# Patient Record
Sex: FEMALE | Race: BLACK OR AFRICAN AMERICAN | Employment: OTHER | URBAN - METROPOLITAN AREA
[De-identification: names, ages, dates, MRNs, and addresses within clinical notes are randomized per-mention and may not be internally consistent; named-entity substitution may affect disease eponyms.]

---

## 2017-01-10 ENCOUNTER — TELEPHONE (OUTPATIENT)
Dept: INTERNAL MEDICINE | Age: 82
End: 2017-01-10

## 2017-02-14 DIAGNOSIS — J45.20 MILD INTERMITTENT ASTHMA WITHOUT COMPLICATION: ICD-10-CM

## 2017-02-14 RX ORDER — ALBUTEROL SULFATE 90 UG/1
2 AEROSOL, METERED RESPIRATORY (INHALATION) EVERY 6 HOURS PRN
Qty: 1 INHALER | Refills: 3 | Status: SHIPPED | OUTPATIENT
Start: 2017-02-14 | End: 2017-05-24 | Stop reason: SDUPTHER

## 2017-02-28 ENCOUNTER — OFFICE VISIT (OUTPATIENT)
Dept: INTERNAL MEDICINE | Age: 82
End: 2017-02-28
Attending: HOSPITALIST

## 2017-02-28 VITALS
TEMPERATURE: 97.5 F | SYSTOLIC BLOOD PRESSURE: 113 MMHG | RESPIRATION RATE: 18 BRPM | WEIGHT: 110.2 LBS | OXYGEN SATURATION: 95 % | BODY MASS INDEX: 17.79 KG/M2 | DIASTOLIC BLOOD PRESSURE: 65 MMHG | HEART RATE: 95 BPM

## 2017-02-28 DIAGNOSIS — J45.30 MILD PERSISTENT ASTHMA WITHOUT COMPLICATION: Chronic | ICD-10-CM

## 2017-02-28 DIAGNOSIS — I10 ESSENTIAL HYPERTENSION: ICD-10-CM

## 2017-02-28 DIAGNOSIS — M81.0 OSTEOPOROSIS: Primary | Chronic | ICD-10-CM

## 2017-02-28 RX ORDER — OYSTER SHELL CALCIUM WITH VITAMIN D 500; 200 MG/1; [IU]/1
1 TABLET, FILM COATED ORAL 2 TIMES DAILY
Qty: 60 TABLET | Refills: 5 | Status: SHIPPED | OUTPATIENT
Start: 2017-02-28 | End: 2017-06-01 | Stop reason: SDUPTHER

## 2017-02-28 RX ORDER — OYSTER SHELL CALCIUM WITH VITAMIN D 500; 200 MG/1; [IU]/1
1 TABLET, FILM COATED ORAL 2 TIMES DAILY
Qty: 60 TABLET | Refills: 2 | Status: SHIPPED | OUTPATIENT
Start: 2017-02-28 | End: 2017-02-28 | Stop reason: SDUPTHER

## 2017-02-28 ASSESSMENT — ENCOUNTER SYMPTOMS
ABDOMINAL PAIN: 0
VOMITING: 0
CONSTIPATION: 0
BACK PAIN: 0
COUGH: 0
NAUSEA: 0
SORE THROAT: 0
DIARRHEA: 0

## 2017-05-24 DIAGNOSIS — J45.20 MILD INTERMITTENT ASTHMA WITHOUT COMPLICATION: ICD-10-CM

## 2017-05-24 RX ORDER — ALBUTEROL SULFATE 90 UG/1
2 AEROSOL, METERED RESPIRATORY (INHALATION) EVERY 6 HOURS PRN
Qty: 1 INHALER | Refills: 1
Start: 2017-05-24 | End: 2017-07-10 | Stop reason: SDUPTHER

## 2017-05-25 DIAGNOSIS — M81.0 OSTEOPOROSIS: ICD-10-CM

## 2017-05-25 RX ORDER — CYCLOSPORINE 0.5 MG/ML
1 EMULSION OPHTHALMIC 3 TIMES DAILY
Qty: 180 EACH | Refills: 1
Start: 2017-05-25 | End: 2017-06-06 | Stop reason: SDUPTHER

## 2017-06-01 DIAGNOSIS — M81.0 OSTEOPOROSIS: Chronic | ICD-10-CM

## 2017-06-01 RX ORDER — OYSTER SHELL CALCIUM WITH VITAMIN D 500; 200 MG/1; [IU]/1
1 TABLET, FILM COATED ORAL 2 TIMES DAILY
Qty: 60 TABLET | Refills: 0
Start: 2017-06-01 | End: 2018-03-22 | Stop reason: SDUPTHER

## 2017-06-06 DIAGNOSIS — M81.0 OSTEOPOROSIS: ICD-10-CM

## 2017-06-06 RX ORDER — CYCLOSPORINE 0.5 MG/ML
1 EMULSION OPHTHALMIC 3 TIMES DAILY
Qty: 180 EACH | Refills: 1
Start: 2017-06-06 | End: 2018-03-20 | Stop reason: SDUPTHER

## 2017-06-20 ENCOUNTER — OFFICE VISIT (OUTPATIENT)
Dept: INTERNAL MEDICINE | Age: 82
End: 2017-06-20
Attending: HOSPITALIST

## 2017-06-20 VITALS
OXYGEN SATURATION: 96 % | DIASTOLIC BLOOD PRESSURE: 70 MMHG | RESPIRATION RATE: 18 BRPM | BODY MASS INDEX: 18.5 KG/M2 | TEMPERATURE: 97.9 F | WEIGHT: 114.6 LBS | SYSTOLIC BLOOD PRESSURE: 130 MMHG | HEART RATE: 98 BPM

## 2017-06-20 DIAGNOSIS — I10 ESSENTIAL HYPERTENSION: Primary | ICD-10-CM

## 2017-06-20 ASSESSMENT — ENCOUNTER SYMPTOMS
CONSTIPATION: 0
COUGH: 0
BACK PAIN: 0
VOMITING: 0
ABDOMINAL PAIN: 0
SORE THROAT: 0
NAUSEA: 0
DIARRHEA: 0

## 2017-07-10 DIAGNOSIS — J45.20 MILD INTERMITTENT ASTHMA WITHOUT COMPLICATION: ICD-10-CM

## 2017-07-10 RX ORDER — ALBUTEROL SULFATE 90 UG/1
2 AEROSOL, METERED RESPIRATORY (INHALATION) EVERY 6 HOURS PRN
Qty: 1 INHALER | Refills: 2
Start: 2017-07-10 | End: 2017-09-19 | Stop reason: SDUPTHER

## 2017-07-10 RX ORDER — ALBUTEROL SULFATE 90 UG/1
2 AEROSOL, METERED RESPIRATORY (INHALATION) EVERY 6 HOURS PRN
Qty: 1 INHALER | Refills: 3
Start: 2017-07-10 | End: 2017-07-10 | Stop reason: SDUPTHER

## 2017-08-15 RX ORDER — AMLODIPINE BESYLATE 5 MG/1
5 TABLET ORAL DAILY
Qty: 90 TABLET | Refills: 1
Start: 2017-08-15 | End: 2018-02-20 | Stop reason: SDUPTHER

## 2017-08-29 ENCOUNTER — OFFICE VISIT (OUTPATIENT)
Dept: INTERNAL MEDICINE | Age: 82
End: 2017-08-29
Attending: HOSPITALIST

## 2017-08-29 VITALS
BODY MASS INDEX: 18.43 KG/M2 | HEART RATE: 82 BPM | SYSTOLIC BLOOD PRESSURE: 140 MMHG | DIASTOLIC BLOOD PRESSURE: 79 MMHG | RESPIRATION RATE: 18 BRPM | OXYGEN SATURATION: 95 % | WEIGHT: 114.2 LBS | TEMPERATURE: 98.2 F

## 2017-08-29 DIAGNOSIS — I10 ESSENTIAL HYPERTENSION: Primary | ICD-10-CM

## 2017-08-29 ASSESSMENT — ENCOUNTER SYMPTOMS
ABDOMINAL PAIN: 0
VOMITING: 0
COUGH: 0
DIARRHEA: 0
SORE THROAT: 0
CONSTIPATION: 0
BACK PAIN: 0
NAUSEA: 0

## 2017-08-30 ENCOUNTER — TELEPHONE (OUTPATIENT)
Dept: INTERNAL MEDICINE | Age: 82
End: 2017-08-30

## 2017-09-11 ENCOUNTER — TELEPHONE (OUTPATIENT)
Dept: INTERNAL MEDICINE | Age: 82
End: 2017-09-11

## 2017-09-19 DIAGNOSIS — J45.20 MILD INTERMITTENT ASTHMA WITHOUT COMPLICATION: ICD-10-CM

## 2017-09-19 RX ORDER — ALBUTEROL SULFATE 90 UG/1
2 AEROSOL, METERED RESPIRATORY (INHALATION) EVERY 6 HOURS PRN
Qty: 1 INHALER | Refills: 2
Start: 2017-09-19 | End: 2017-11-21 | Stop reason: SDUPTHER

## 2017-11-21 DIAGNOSIS — J45.20 MILD INTERMITTENT ASTHMA WITHOUT COMPLICATION: ICD-10-CM

## 2017-11-21 RX ORDER — ALBUTEROL SULFATE 90 UG/1
2 AEROSOL, METERED RESPIRATORY (INHALATION) EVERY 6 HOURS PRN
Qty: 1 INHALER | Refills: 1 | OUTPATIENT
Start: 2017-11-21 | End: 2017-12-27 | Stop reason: SDUPTHER

## 2017-11-28 ENCOUNTER — OFFICE VISIT (OUTPATIENT)
Dept: INTERNAL MEDICINE | Age: 82
End: 2017-11-28
Attending: HOSPITALIST

## 2017-11-28 VITALS
DIASTOLIC BLOOD PRESSURE: 78 MMHG | OXYGEN SATURATION: 97 % | WEIGHT: 118.6 LBS | TEMPERATURE: 97 F | RESPIRATION RATE: 18 BRPM | HEART RATE: 90 BPM | SYSTOLIC BLOOD PRESSURE: 129 MMHG | BODY MASS INDEX: 19.14 KG/M2

## 2017-11-28 DIAGNOSIS — I10 HYPERTENSION, UNSPECIFIED TYPE: Primary | ICD-10-CM

## 2017-11-28 ASSESSMENT — ENCOUNTER SYMPTOMS
ABDOMINAL PAIN: 0
CONSTIPATION: 0
DIARRHEA: 0
SORE THROAT: 0
COUGH: 0
BACK PAIN: 0
VOMITING: 0
NAUSEA: 0

## 2017-11-28 NOTE — PROGRESS NOTES
hours as needed for Wheezing 11/21/17  Yes Ludmila Soler MD   amLODIPine [facility]) 5 MG tablet Take 1 tablet by mouth daily 8/15/17  Yes Ludmila Soler MD   cycloSPORINE (RESTASIS) 0.05 % ophthalmic emulsion Place 1 drop into both eyes three times daily 6/6/17  Yes Evita Mckenzie MD   calcium-vitamin D (OSCAL-500) 500-200 MG-UNIT per tablet Take 1 tablet by mouth 2 times daily 6/1/17  Yes Jenni Huang MD   budesonide (PULMICORT FLEXHALER) 180 MCG/ACT AEPB inhaler Inhale 1 puff into the lungs 2 times daily 11/8/16   Jason Riddle MD   Spacer/Aero-Holding María Aniyah 1 Device by Does not apply route daily as needed (asthma) 11/8/16   Jason Riddle MD     Review of Systems   Constitutional: Negative for chills and fever. HENT: Negative for congestion and sore throat. Respiratory: Negative for cough. Cardiovascular: Negative for chest pain and palpitations. Gastrointestinal: Negative for abdominal pain, constipation, diarrhea, nausea and vomiting. Genitourinary: Negative for dysuria. Musculoskeletal: Negative for back pain. Skin: Negative for rash. Neurological: Positive for headaches. Negative for loss of consciousness. Psychiatric/Behavioral: Negative for depression. Physical Exam:      Vitals:   /78   Pulse 90   Temp 97 °F (36.1 °C)   Resp 18   Wt 118 lb 9.6 oz (53.8 kg)   SpO2 97%   BMI 19.14 kg/m²     Body mass index is 19.14 kg/m². Wt Readings from Last 3 Encounters:   11/28/17 118 lb 9.6 oz (53.8 kg)   08/29/17 114 lb 3.2 oz (51.8 kg)   06/20/17 114 lb 9.6 oz (52 kg)       General:  Patient conversant, appears comfortable, no acute distress, thin emaciated. Skin: No rash, No jaundice. Eyes: PERRL. No conjunctival Injection. No Scleral Icterus. EOMI. Ears: No tenderness or discharge, No TM inflammation    Nose: Nares Symmetric, No tenderness or discharge, No Frontal/Maxillary Sinus Tenderness. Mouth, Throat: No erythema, exudates.    Neck: Supple, No cervical Lymphadenopathy, Normal Thyroid size/no goiter. Heart: RRR, nl s1, s2 no murmurs, rubs, gallops  Lungs: CTA B, no wheezing, no rhonchi, no rales. Abdomen: Soft non tender, non-distended. MSK: Muscle strength grossly intact. Vascular: Radial pulses present, normal b/l. Dorsalis Pedis pulses present, normal b/l. Neurologcial: No focal deficits. Cranial Nerves 2-12 intact. Sensation grossly normal. DTRs normal throughout. LABS:    Chemistry:  No results for input(s): BUN, CREATININE, NA, K, CO2, CL, MG, PHOS, AST, ALT, ALB, PROT in the last 72 hours. Invalid input(s): GLU, CA, TBILI, DBILI, ALP, GLUFASTING    No results for input(s): ALKPHOS, ALT, AST, PROT, BILITOT, BILIDIR, LABALBU in the last 72 hours. No results for input(s): Huong Cassidy, LABMICAMBROCIO, MICROALKAMI, Brandon Andrade in the last 72 hours. Lab Results   Component Value Date    TSH 1.49 07/16/2013     Hematology:  No results for input(s): WBC, HGB, HCT, PLT, MCV, MCH, MCHC, RDW, EOSABS in the last 72 hours. Invalid input(s): NEUTP, LYMPHP, MONOSP, EOSP, BASOP, NEUTABS, LYMPHABS, MONOABS, BASOABS    No results found for: IRON, TIBC, FERRITIN, FOLATE, [id number], PTH    Lipid:  Lab Results   Component Value Date    CHOL 163 04/14/2015    HDL 88 (H) 04/14/2015    LDLCALC 57 04/14/2015    TRIG 90 04/14/2015       U/A:No results found for: LABMICR, [id number]    Imaging:   No results found. Health Maintenance   INFLUENZA: Refused FLU vaccine. Assessment/Plan:     [de-identified] yo F with Dementia presents for routine clinic follow up. No significant changes made during today's visit. Dementia  -Likely Alzheimer's. Pt has trouble managing her simple medication regimen and gets easily confused. She attributes this to age. Scored a 13 on MOCA. Scored a 21 on MMSE.   -Patient previously on Namenda but has stopped taking it due to lack of efficacy per patient.  -Will try to get home health or COA involved again as I am worried about

## 2017-12-27 DIAGNOSIS — J45.20 MILD INTERMITTENT ASTHMA WITHOUT COMPLICATION: ICD-10-CM

## 2018-02-20 DIAGNOSIS — J45.20 MILD INTERMITTENT ASTHMA WITHOUT COMPLICATION: ICD-10-CM

## 2018-02-20 RX ORDER — ALBUTEROL SULFATE 90 UG/1
AEROSOL, METERED RESPIRATORY (INHALATION)
Qty: 18 INHALER | Refills: 1 | Status: CANCELLED | OUTPATIENT
Start: 2018-02-20

## 2018-02-20 RX ORDER — AMLODIPINE BESYLATE 5 MG/1
5 TABLET ORAL DAILY
Qty: 90 TABLET | Refills: 1
Start: 2018-02-20 | End: 2018-02-21 | Stop reason: SDUPTHER

## 2018-02-21 RX ORDER — AMLODIPINE BESYLATE 5 MG/1
5 TABLET ORAL DAILY
Qty: 30 TABLET | Refills: 1
Start: 2018-02-21 | End: 2018-03-20 | Stop reason: SDUPTHER

## 2018-03-20 ENCOUNTER — OFFICE VISIT (OUTPATIENT)
Dept: INTERNAL MEDICINE | Age: 83
End: 2018-03-20
Attending: HOSPITALIST

## 2018-03-20 VITALS
BODY MASS INDEX: 20.01 KG/M2 | TEMPERATURE: 97.8 F | RESPIRATION RATE: 16 BRPM | WEIGHT: 124 LBS | SYSTOLIC BLOOD PRESSURE: 153 MMHG | HEART RATE: 102 BPM | OXYGEN SATURATION: 96 % | DIASTOLIC BLOOD PRESSURE: 89 MMHG

## 2018-03-20 DIAGNOSIS — M81.0 AGE-RELATED OSTEOPOROSIS WITHOUT CURRENT PATHOLOGICAL FRACTURE: ICD-10-CM

## 2018-03-20 DIAGNOSIS — J45.20 MILD INTERMITTENT ASTHMA WITHOUT COMPLICATION: Primary | Chronic | ICD-10-CM

## 2018-03-20 DIAGNOSIS — I10 ESSENTIAL HYPERTENSION: ICD-10-CM

## 2018-03-20 DIAGNOSIS — R63.4 WEIGHT LOSS, UNINTENTIONAL: Chronic | ICD-10-CM

## 2018-03-20 RX ORDER — CYCLOSPORINE 0.5 MG/ML
1 EMULSION OPHTHALMIC 3 TIMES DAILY
Qty: 180 EACH | Refills: 1 | Status: SHIPPED | OUTPATIENT
Start: 2018-03-20 | End: 2019-08-15 | Stop reason: SDUPTHER

## 2018-03-20 RX ORDER — AMLODIPINE BESYLATE 5 MG/1
5 TABLET ORAL DAILY
Qty: 30 TABLET | Refills: 1 | Status: SHIPPED | OUTPATIENT
Start: 2018-03-20 | End: 2018-04-26 | Stop reason: SDUPTHER

## 2018-03-20 ASSESSMENT — ENCOUNTER SYMPTOMS
NAUSEA: 0
BACK PAIN: 0
CONSTIPATION: 0
SORE THROAT: 0
DIARRHEA: 0
COUGH: 0
ABDOMINAL PAIN: 0
VOMITING: 0

## 2018-03-20 NOTE — PROGRESS NOTES
Department Of Internal Medicine  General Medicine/Primary Care  Established Patient Visit    Patient:  Lachelle Huggins                                               : 1935  Age: 81 y.o. MRN: [id number]  Date : 3/20/2018    History Obtained From:  patient    REASON FOR VISIT:  Clinic follow up (routine)    HISTORY OF PRESENT ILLNESS:   The patient is a 81 y.o. female w/ PMH of HTN, asthma, and anxiety who presents for follow up. Overall she is feeling better. Appetite has improved. Upset she is not able to drive. Has a [employer] member drive her to services. Has not been taking her pulmicort, only her ventolin inhaler. States she is feeling good and hasn't been using her inhaler too much but is unable to specify how often she is using it. Able to walk up and down 3 flights of stairs without getting SOB. Patient previously prescribed Namenda for dementia. She stopped taking it after a few months since it did not seem to be helping. Still able to do ADLs without problems, no falls. Goes to [employer] every week. Past Medical History:        Diagnosis Date    Asthma     Constipation     Hypertension     Osteoporosis     Sinus trouble     hx of    Vitamin D deficiency        Past Surgical History:        Procedure Laterality Date    HYSTERECTOMY      THYROIDECTOMY  66       Family History:   No family history on file. Social History:   TOBACCO:   reports that she quit smoking about 49 years ago. She does not have any smokeless tobacco history on file. ETOH:   reports that she does not drink alcohol. OCCUPATION:  Worked in [employer]    Allergies:  Aspirin; Codeine; Pcn [penicillins]; Prednisone; and Zyban [bupropion hcl]    Current Medications:    Prior to Admission medications    Medication Sig Start Date End Date Taking?  Authorizing Provider   amLODIPine (NORVASC) 5 MG tablet Take 1 tablet by mouth daily 18  Yes Chirag Hammer MD   VENTOLIN  (90 Base) MCG/ACT inhaler INHALE 2 PUFFS EVERY 6 HOURS AS NEEDED 12/27/17  Yes Portia Alonso MD   cycloSPORINE (RESTASIS) 0.05 % ophthalmic emulsion Place 1 drop into both eyes three times daily 6/6/17  Yes Moris Oneill MD   calcium-vitamin D (OSCAL-500) 500-200 MG-UNIT per tablet Take 1 tablet by mouth 2 times daily 6/1/17  Yes Cecile Alpers, MD   budesonide (PULMICORT FLEXHALER) 180 MCG/ACT AEPB inhaler Inhale 1 puff into the lungs 2 times daily 11/8/16  Yes Ld Bain MD   Spacer/Aero-Holding Mariola Alexandr 1 Device by Does not apply route daily as needed (asthma) 11/8/16  Yes Ld Bain MD     Review of Systems   Constitutional: Negative for chills and fever. HENT: Negative for congestion and sore throat. Respiratory: Negative for cough. Cardiovascular: Negative for chest pain and palpitations. Gastrointestinal: Negative for abdominal pain, constipation, diarrhea, nausea and vomiting. Genitourinary: Negative for dysuria. Musculoskeletal: Negative for back pain. Skin: Negative for rash. Neurological: Negative for loss of consciousness and headaches. Psychiatric/Behavioral: Negative for depression. Physical Exam:      Vitals:   BP (!) 153/89 (Site: Right Arm, Position: Sitting, Cuff Size: Medium Adult)   Pulse 102   Temp 97.8 °F (36.6 °C) (Oral)   Resp 16   Wt 124 lb (56.2 kg)   SpO2 96%   BMI 20.01 kg/m²     Body mass index is 20.01 kg/m². Wt Readings from Last 3 Encounters:   03/20/18 124 lb (56.2 kg)   11/28/17 118 lb 9.6 oz (53.8 kg)   08/29/17 114 lb 3.2 oz (51.8 kg)       General:  Patient conversant, appears comfortable, no acute distress, thin emaciated. Skin: No rash, No jaundice. Eyes: PERRL. No conjunctival Injection. No Scleral Icterus. EOMI. Ears: No tenderness or discharge, No TM inflammation    Nose: Nares Symmetric, No tenderness or discharge, No Frontal/Maxillary Sinus Tenderness. Mouth, Throat: No erythema, exudates.    Neck: Supple, No

## 2018-03-22 RX ORDER — OYSTER SHELL CALCIUM WITH VITAMIN D 500; 200 MG/1; [IU]/1
1 TABLET, FILM COATED ORAL 2 TIMES DAILY
Qty: 60 TABLET | Refills: 2
Start: 2018-03-22 | End: 2018-12-19 | Stop reason: SDUPTHER

## 2018-04-10 DIAGNOSIS — J45.20 MILD INTERMITTENT ASTHMA WITHOUT COMPLICATION: ICD-10-CM

## 2018-04-10 RX ORDER — ALBUTEROL SULFATE 90 UG/1
2 AEROSOL, METERED RESPIRATORY (INHALATION) EVERY 6 HOURS PRN
Qty: 18 INHALER | Refills: 5
Start: 2018-04-10 | End: 2018-04-10 | Stop reason: SDUPTHER

## 2018-04-10 RX ORDER — ALBUTEROL SULFATE 90 UG/1
2 AEROSOL, METERED RESPIRATORY (INHALATION) EVERY 6 HOURS PRN
Qty: 18 INHALER | Refills: 1
Start: 2018-04-10 | End: 2018-04-26 | Stop reason: SDUPTHER

## 2018-04-26 DIAGNOSIS — J45.20 MILD INTERMITTENT ASTHMA WITHOUT COMPLICATION: ICD-10-CM

## 2018-04-26 DIAGNOSIS — I10 ESSENTIAL HYPERTENSION: ICD-10-CM

## 2018-04-26 RX ORDER — ALBUTEROL SULFATE 90 UG/1
2 AEROSOL, METERED RESPIRATORY (INHALATION) EVERY 6 HOURS PRN
Qty: 18 INHALER | Refills: 1 | OUTPATIENT
Start: 2018-04-26 | End: 2018-09-28 | Stop reason: SDUPTHER

## 2018-04-26 RX ORDER — AMLODIPINE BESYLATE 5 MG/1
5 TABLET ORAL DAILY
Qty: 30 TABLET | Refills: 1 | OUTPATIENT
Start: 2018-04-26 | End: 2018-07-31 | Stop reason: SDUPTHER

## 2018-07-31 DIAGNOSIS — I10 ESSENTIAL HYPERTENSION: ICD-10-CM

## 2018-07-31 RX ORDER — AMLODIPINE BESYLATE 5 MG/1
5 TABLET ORAL DAILY
Qty: 30 TABLET | Refills: 0
Start: 2018-07-31 | End: 2018-08-21 | Stop reason: SDUPTHER

## 2018-08-21 DIAGNOSIS — I10 ESSENTIAL HYPERTENSION: ICD-10-CM

## 2018-08-21 RX ORDER — AMLODIPINE BESYLATE 5 MG/1
5 TABLET ORAL DAILY
Qty: 30 TABLET | Refills: 1
Start: 2018-08-21 | End: 2018-10-08 | Stop reason: SDUPTHER

## 2018-09-28 DIAGNOSIS — J45.20 MILD INTERMITTENT ASTHMA WITHOUT COMPLICATION: ICD-10-CM

## 2018-09-28 RX ORDER — ALBUTEROL SULFATE 90 UG/1
2 AEROSOL, METERED RESPIRATORY (INHALATION) EVERY 6 HOURS PRN
Qty: 18 INHALER | Refills: 1
Start: 2018-09-28 | End: 2018-11-29 | Stop reason: SDUPTHER

## 2018-10-08 DIAGNOSIS — I10 ESSENTIAL HYPERTENSION: ICD-10-CM

## 2018-10-08 RX ORDER — AMLODIPINE BESYLATE 5 MG/1
5 TABLET ORAL DAILY
Qty: 30 TABLET | Refills: 0
Start: 2018-10-08 | End: 2018-12-19 | Stop reason: SDUPTHER

## 2018-11-29 DIAGNOSIS — J45.20 MILD INTERMITTENT ASTHMA WITHOUT COMPLICATION: ICD-10-CM

## 2018-11-29 RX ORDER — ALBUTEROL SULFATE 90 UG/1
2 AEROSOL, METERED RESPIRATORY (INHALATION) EVERY 6 HOURS PRN
Qty: 18 INHALER | Refills: 0 | OUTPATIENT
Start: 2018-11-29 | End: 2018-12-19 | Stop reason: SDUPTHER

## 2018-12-19 ENCOUNTER — OFFICE VISIT (OUTPATIENT)
Dept: INTERNAL MEDICINE CLINIC | Age: 83
End: 2018-12-19
Payer: MEDICARE

## 2018-12-19 VITALS
HEIGHT: 66 IN | OXYGEN SATURATION: 98 % | DIASTOLIC BLOOD PRESSURE: 84 MMHG | HEART RATE: 85 BPM | TEMPERATURE: 97.8 F | BODY MASS INDEX: 19.73 KG/M2 | RESPIRATION RATE: 16 BRPM | SYSTOLIC BLOOD PRESSURE: 139 MMHG | WEIGHT: 122.8 LBS

## 2018-12-19 DIAGNOSIS — I10 ESSENTIAL HYPERTENSION: ICD-10-CM

## 2018-12-19 DIAGNOSIS — Z13.220 SCREENING FOR HYPERLIPIDEMIA: Primary | ICD-10-CM

## 2018-12-19 DIAGNOSIS — E03.2 HYPOTHYROIDISM DUE TO NON-MEDICATION EXOGENOUS SUBSTANCES: ICD-10-CM

## 2018-12-19 DIAGNOSIS — J45.20 MILD INTERMITTENT ASTHMA WITHOUT COMPLICATION: ICD-10-CM

## 2018-12-19 DIAGNOSIS — R79.9 BLOOD CHEMISTRY ABNORMALITY: ICD-10-CM

## 2018-12-19 DIAGNOSIS — D50.8 OTHER IRON DEFICIENCY ANEMIA: ICD-10-CM

## 2018-12-19 LAB
A/G RATIO: 1.3 (ref 1.1–2.2)
ALBUMIN SERPL-MCNC: 4.4 G/DL (ref 3.4–5)
ALP BLD-CCNC: 79 U/L (ref 40–129)
ALT SERPL-CCNC: 10 U/L (ref 10–40)
ANION GAP SERPL CALCULATED.3IONS-SCNC: 10 MMOL/L (ref 3–16)
AST SERPL-CCNC: 14 U/L (ref 15–37)
BASOPHILS ABSOLUTE: 0 K/UL (ref 0–0.2)
BASOPHILS RELATIVE PERCENT: 0.9 %
BILIRUB SERPL-MCNC: 0.7 MG/DL (ref 0–1)
BUN BLDV-MCNC: 10 MG/DL (ref 7–20)
CALCIUM SERPL-MCNC: 10 MG/DL (ref 8.3–10.6)
CHLORIDE BLD-SCNC: 103 MMOL/L (ref 99–110)
CHOLESTEROL, TOTAL: 202 MG/DL (ref 0–199)
CO2: 29 MMOL/L (ref 21–32)
CREAT SERPL-MCNC: 0.6 MG/DL (ref 0.6–1.2)
EOSINOPHILS ABSOLUTE: 0.2 K/UL (ref 0–0.6)
EOSINOPHILS RELATIVE PERCENT: 3.5 %
GFR AFRICAN AMERICAN: >60
GFR NON-AFRICAN AMERICAN: >60
GLOBULIN: 3.3 G/DL
GLUCOSE BLD-MCNC: 85 MG/DL (ref 70–99)
HCT VFR BLD CALC: 42.8 % (ref 36–48)
HDLC SERPL-MCNC: 59 MG/DL (ref 40–60)
HEMOGLOBIN: 13.9 G/DL (ref 12–16)
LDL CHOLESTEROL CALCULATED: 117 MG/DL
LYMPHOCYTES ABSOLUTE: 1.4 K/UL (ref 1–5.1)
LYMPHOCYTES RELATIVE PERCENT: 27.1 %
MCH RBC QN AUTO: 31 PG (ref 26–34)
MCHC RBC AUTO-ENTMCNC: 32.5 G/DL (ref 31–36)
MCV RBC AUTO: 95.6 FL (ref 80–100)
MONOCYTES ABSOLUTE: 0.2 K/UL (ref 0–1.3)
MONOCYTES RELATIVE PERCENT: 4.2 %
NEUTROPHILS ABSOLUTE: 3.4 K/UL (ref 1.7–7.7)
NEUTROPHILS RELATIVE PERCENT: 64.3 %
PDW BLD-RTO: 12.4 % (ref 12.4–15.4)
PLATELET # BLD: 264 K/UL (ref 135–450)
PMV BLD AUTO: 8.7 FL (ref 5–10.5)
POTASSIUM SERPL-SCNC: 3.8 MMOL/L (ref 3.5–5.1)
RBC # BLD: 4.47 M/UL (ref 4–5.2)
SODIUM BLD-SCNC: 142 MMOL/L (ref 136–145)
TOTAL PROTEIN: 7.7 G/DL (ref 6.4–8.2)
TRIGL SERPL-MCNC: 130 MG/DL (ref 0–150)
TSH REFLEX: 1.15 UIU/ML (ref 0.27–4.2)
VLDLC SERPL CALC-MCNC: 26 MG/DL
WBC # BLD: 5.3 K/UL (ref 4–11)

## 2018-12-19 PROCEDURE — 85025 COMPLETE CBC W/AUTO DIFF WBC: CPT

## 2018-12-19 PROCEDURE — 82746 ASSAY OF FOLIC ACID SERUM: CPT

## 2018-12-19 PROCEDURE — 84443 ASSAY THYROID STIM HORMONE: CPT

## 2018-12-19 PROCEDURE — 80053 COMPREHEN METABOLIC PANEL: CPT

## 2018-12-19 PROCEDURE — 80061 LIPID PANEL: CPT

## 2018-12-19 PROCEDURE — 36415 COLL VENOUS BLD VENIPUNCTURE: CPT | Performed by: INTERNAL MEDICINE

## 2018-12-19 PROCEDURE — 99213 OFFICE O/P EST LOW 20 MIN: CPT | Performed by: INTERNAL MEDICINE

## 2018-12-19 PROCEDURE — 82306 VITAMIN D 25 HYDROXY: CPT

## 2018-12-19 RX ORDER — OYSTER SHELL CALCIUM WITH VITAMIN D 500; 200 MG/1; [IU]/1
1 TABLET, FILM COATED ORAL 2 TIMES DAILY
Qty: 60 TABLET | Refills: 2 | Status: SHIPPED | OUTPATIENT
Start: 2018-12-19 | End: 2019-04-23

## 2018-12-19 RX ORDER — AMLODIPINE BESYLATE 5 MG/1
5 TABLET ORAL DAILY
Qty: 30 TABLET | Refills: 3 | Status: SHIPPED | OUTPATIENT
Start: 2018-12-19 | End: 2019-04-04 | Stop reason: SDUPTHER

## 2018-12-19 RX ORDER — ALBUTEROL SULFATE 90 UG/1
2 AEROSOL, METERED RESPIRATORY (INHALATION) EVERY 6 HOURS PRN
Qty: 18 INHALER | Refills: 3 | Status: SHIPPED | OUTPATIENT
Start: 2018-12-19

## 2018-12-19 NOTE — PROGRESS NOTES
polydipsia,polyuria,abnormal weight changes,heat /cold intolerance. · ALL/IMM : Neg reactions to drugs other than listed,food,insects,skin rash,trouble breathing,local or general lymph node enlargement or tenderness. Physical Exam:      Vitals: /84 (Site: Left Upper Arm, Position: Sitting, Cuff Size: Medium Adult)   Pulse 85   Temp 97.8 °F (36.6 °C) (Oral)   Resp 16   Ht 5' 6\" (1.676 m)   Wt 122 lb 12.8 oz (55.7 kg)   SpO2 98%   BMI 19.82 kg/m²     Body mass index is 19.82 kg/m². Wt Readings from Last 3 Encounters:   12/19/18 122 lb 12.8 oz (55.7 kg)   03/20/18 124 lb (56.2 kg)   11/28/17 118 lb 9.6 oz (53.8 kg)       · Gen - Alert, no signs of distress, very thin female appears stated age and cooperative  · HEENT - NC/AT  · Eye - Normal external eye, conjunctiva, lids cornea, PERLLA,  · Nose - Normal external nose, mucus membranes  · Pharynx - Dental Hygiene adequate. Normal buccal mucosa. Normal pharynx  · CVS - Regular rate. Regular rhythm. No mumur or rub. No edema  · Resp - No accessory muscle use, wheezing in left upper lobe  · GI - Non-tender. Non-distended. No masses. No organmegaly. Normal bowel sounds  · Skin - Warm and dry. · MSK - No cyanosis. No joint deformity. · Neuro - Awake. Follows commands. CN II-XII Grossly Intact. Sensation intact. Strength 5/5 UE and LE. Reflexes 1+ UE and LE stan.    · Psych - starts to shake when talking about her second inhaler, repeats phrases several times, does not seem to understand why she needs her medications    LABS:    CBC:   Lab Results   Component Value Date    WBC 4.7 08/09/2016    HGB 13.3 08/09/2016    HCT 40.9 08/09/2016    MCV 95.0 08/09/2016     08/09/2016         No results found for: IRON, TIBC, FERRITIN, FOLATE, [id number], PTH                                                          BMP:    Lab Results   Component Value Date     08/09/2016    K 4.4 08/09/2016     08/09/2016    CO2 30 08/09/2016       LFT's:   Lab

## 2018-12-20 LAB
FOLATE: 19.9 NG/ML (ref 4.78–24.2)
VITAMIN D 25-HYDROXY: 32.4 NG/ML

## 2019-01-15 RX ORDER — FLUTICASONE PROPIONATE 220 UG/1
2 AEROSOL, METERED RESPIRATORY (INHALATION) 2 TIMES DAILY
Qty: 1 INHALER | Refills: 3 | OUTPATIENT
Start: 2019-01-15 | End: 2019-04-04 | Stop reason: SDUPTHER

## 2019-01-30 RX ORDER — FLUTICASONE PROPIONATE 110 UG/1
2 AEROSOL, METERED RESPIRATORY (INHALATION) 2 TIMES DAILY
Qty: 1 INHALER | Refills: 3 | Status: SHIPPED | OUTPATIENT
Start: 2019-01-30 | End: 2019-04-23 | Stop reason: ALTCHOICE

## 2019-04-04 DIAGNOSIS — I10 ESSENTIAL HYPERTENSION: ICD-10-CM

## 2019-04-04 RX ORDER — FLUTICASONE PROPIONATE 220 UG/1
AEROSOL, METERED RESPIRATORY (INHALATION)
Qty: 1 INHALER | Refills: 0
Start: 2019-04-04 | End: 2019-04-23 | Stop reason: ALTCHOICE

## 2019-04-04 RX ORDER — AMLODIPINE BESYLATE 5 MG/1
5 TABLET ORAL DAILY
Qty: 30 TABLET | Refills: 0
Start: 2019-04-04 | End: 2019-04-09 | Stop reason: SDUPTHER

## 2019-04-09 DIAGNOSIS — I10 ESSENTIAL HYPERTENSION: ICD-10-CM

## 2019-04-09 RX ORDER — AMLODIPINE BESYLATE 5 MG/1
5 TABLET ORAL DAILY
Qty: 30 TABLET | Refills: 0
Start: 2019-04-09 | End: 2019-04-23 | Stop reason: SDUPTHER

## 2019-04-09 RX ORDER — AMLODIPINE BESYLATE 5 MG/1
5 TABLET ORAL DAILY
Qty: 30 TABLET | Refills: 0
Start: 2019-04-09 | End: 2019-04-09 | Stop reason: SDUPTHER

## 2019-04-23 ENCOUNTER — OFFICE VISIT (OUTPATIENT)
Dept: INTERNAL MEDICINE CLINIC | Age: 84
End: 2019-04-23
Payer: MEDICARE

## 2019-04-23 VITALS
OXYGEN SATURATION: 98 % | TEMPERATURE: 97.8 F | WEIGHT: 117.1 LBS | SYSTOLIC BLOOD PRESSURE: 112 MMHG | DIASTOLIC BLOOD PRESSURE: 69 MMHG | BODY MASS INDEX: 19.51 KG/M2 | RESPIRATION RATE: 24 BRPM | HEIGHT: 65 IN | HEART RATE: 94 BPM

## 2019-04-23 DIAGNOSIS — I10 ESSENTIAL HYPERTENSION: ICD-10-CM

## 2019-04-23 DIAGNOSIS — R63.4 WEIGHT LOSS, UNINTENTIONAL: Primary | ICD-10-CM

## 2019-04-23 DIAGNOSIS — Z13.820 SCREENING FOR OSTEOPOROSIS: ICD-10-CM

## 2019-04-23 DIAGNOSIS — J45.20 MILD INTERMITTENT ASTHMA WITHOUT COMPLICATION: ICD-10-CM

## 2019-04-23 DIAGNOSIS — M81.0 AGE-RELATED OSTEOPOROSIS WITHOUT CURRENT PATHOLOGICAL FRACTURE: ICD-10-CM

## 2019-04-23 PROCEDURE — 99213 OFFICE O/P EST LOW 20 MIN: CPT | Performed by: STUDENT IN AN ORGANIZED HEALTH CARE EDUCATION/TRAINING PROGRAM

## 2019-04-23 RX ORDER — AMLODIPINE BESYLATE 5 MG/1
5 TABLET ORAL DAILY
Qty: 30 TABLET | Refills: 0 | Status: SHIPPED | OUTPATIENT
Start: 2019-04-23 | End: 2019-09-11 | Stop reason: SDUPTHER

## 2019-04-23 NOTE — PROGRESS NOTES
Outpatient Clinic Visit Note    Patient: Karina Sarkar  : 1935 (81 y.o.)  Date: 2019    CC: routine check up    HPI:    81year old female with PMH of slight dementia, HTN, asthma, who presents for routine follow up. She had her appointment dates confused and decided to come in today. She is alert and oriented to self and place but not time or situation. She has no active complaints. She states she is healthy. She lives alone in senior citizen building. She is taken care by her grandson and cousins. They help her with groceries. She prepares her own food (breakfast, lunch, dinner). She has been gaining weight, she takes supplements like Ensure. Two years ago she weighed 104lbs, today she is 117 lbs. She takes her medications daily. Her bills are paid by her sons. Does not drive. Does not get dizzy or lightheadedness, no falls, no difficulty moving around. No nausea, vomiting, constipation, diarrhea, no chest pain, SOB. She does not have to use her inhaler often, uses it 1-2 a month. No dysuria, urinary changes. Past Medical History:    Past Medical History:   Diagnosis Date    Asthma     Constipation     Hypertension     Osteoporosis     Sinus trouble     hx of    Vitamin D deficiency        Past Surgical History:  Past Surgical History:   Procedure Laterality Date    HYSTERECTOMY      THYROIDECTOMY         Home Medications:  Has been reviewed and as documented. Allergies:    Aspirin; Codeine; Pcn [penicillins]; Prednisone; and Zyban [bupropion hcl]    Family History:   No family history on file. ROS: A 10-organ Review Of Systems was obtained and otherwise unremarkable except as per HPI. Data: Old records have been reviewed electronically.       PHYSICAL EXAM:  /69 (Site: Right Upper Arm, Position: Sitting, Cuff Size: Medium Adult)   Pulse 94   Temp 97.8 °F (36.6 °C) (Oral)   Resp 24   Ht 5' 5\" (1.651 m)   Wt 117 lb 1.6 oz (53.1 kg)   SpO2 98%   BMI 19.49 kg/m²   Physical Exam   Constitutional: She is oriented to person, place, and time. She appears well-developed and well-nourished. No distress. HENT:   Head: Normocephalic and atraumatic. Eyes: Pupils are equal, round, and reactive to light. Neck: Normal range of motion. Cardiovascular: Normal rate, regular rhythm, normal heart sounds and intact distal pulses. No murmur heard. Pulmonary/Chest: Effort normal and breath sounds normal. No stridor. No respiratory distress. She has no wheezes. Abdominal: Soft. Bowel sounds are normal. She exhibits no distension. There is no tenderness. Musculoskeletal: Normal range of motion. She exhibits no edema or deformity. Neurological: She is alert and oriented to person, place, and time. Skin: Skin is warm and dry. She is not diaphoretic. Assessment & Plan:      Dementia  Previously diagnosed with Alzhiemers and was tried on Namenda but stopped medication due to lack of efficacy. There was worrisome regarding whether patient can take care of herself, was seen by [facility] who said patient had enough family support and does not require their services. - will consider aricept next visit but dementia seems to be stable    HTN  Controlled.    - cont amlodipine    Intermittent Asthma  - cont pulmicort as needed      Dispo: Pt has been staffed with Dr. Norma Alfonso  _______________  Shonna Van MD 4/23/2019 9:26 AM   PGY2 Internal Medicine

## 2019-04-23 NOTE — PROGRESS NOTES
[facility] NOTE      2019  Urszula Archuleta    Chief Complaint:   Chief Complaint   Patient presents with    Follow-up       Constitutional: Alert; family member drove her here; oriented to place, knows ; remembers eating cereal for breakfast; didn't know what month it is; doesn't know name of president; weight loss 5lbs in 4 months  Eyes: wears glasses  Ears, nose, mouth, throat, and face: negative  Respiratory: states she is not using inhalers lately.   Cardiovascular: negative  Gastrointestinal: weight loss; states she likes to eat; states not taking Ensure anymore;  states not constipated  Genitourinary: negative   states she urinates well citing \"I drink a lot of fluids all day\"  Integument/breast: negative  Hematologic/lymphatic: negative  Musculoskeletal: negative  Neurological: negative  Diabetes: No      Pain Assessment:  Pain Present: no  Pain Score: 0  Pain Quality/Description: denies pain    Mobility/Safety/Self-Care:  Steady Gait: Yes  History of Falls: No   History of a Fall within the last 30 days No  Assistive Device: None  Poor Hygiene: No    Psychosocial:   Depression: No  denies  If YES,    Does Patient express current thoughts of harming self or others?: No  denies  Anxious: No  Insomnia: No  Inappropriate Behavior: No  Alcohol Abuse: no  denies  Substance Abuse: no  Signs of Physical Abuse: No  Signs of Emotional Abuse: No    Educational:  Identify the learner who is being assessed for education:  patient                    Ability to Learn:  Exhibits ability to grasp concepts and respond to questions: Medium  Ready to Learn: Yes  calm   Preferred Method of Learning:  written  Barriers to Learning: Verbalizes interest  Special Considerations due to cultural, Baptist, spiritual beliefs:  No  Language:  English  :  No    Note:         8:59 AM 2019

## 2019-04-23 NOTE — PATIENT INSTRUCTIONS
Before any of you medication is completely gone, call your pharmacy AT LEAST 1 WEEK ahead for refills. Review all information regarding your medication before starting. If you become ill when the clinic is closed, please call the [facility] [employer]  at   #[phone] and ask the  to page the AOD between 6:00 AM and 6:00 PM or page the AON between 6:00 PM and 6:00 am    The clinic is not able to process MY CHART requests for appointments or messages including requests. Please call the [address] [place] [address] at [phone]  For appointments and messages. Call your pharmacy for medication refills. Return to clinic 3 months. We will call you with appointment for August    Dexa Scan ordered.     Refills given Ensure, Amlodipine, Pulmicort inhaler    Continue medication as listed on discharge sheet    Instructions reviewed before discharge and copy given to patient    [address] [phone]

## 2019-05-08 ENCOUNTER — HOSPITAL ENCOUNTER (OUTPATIENT)
Dept: GENERAL RADIOLOGY | Age: 84
Discharge: HOME | End: 2019-05-08
Payer: MEDICARE

## 2019-05-08 DIAGNOSIS — M81.0 AGE-RELATED OSTEOPOROSIS WITHOUT CURRENT PATHOLOGICAL FRACTURE: ICD-10-CM

## 2019-05-08 PROCEDURE — 77080 DXA BONE DENSITY AXIAL: CPT

## 2019-05-21 DIAGNOSIS — M81.0 AGE-RELATED OSTEOPOROSIS WITHOUT CURRENT PATHOLOGICAL FRACTURE: Primary | ICD-10-CM

## 2019-05-21 RX ORDER — ALENDRONATE SODIUM 70 MG/1
70 TABLET ORAL
Qty: 12 TABLET | Refills: 1 | Status: SHIPPED | OUTPATIENT
Start: 2019-05-21 | End: 2019-07-26 | Stop reason: SDUPTHER

## 2019-05-22 ENCOUNTER — TELEPHONE (OUTPATIENT)
Dept: INTERNAL MEDICINE CLINIC | Age: 84
End: 2019-05-22

## 2019-06-18 RX ORDER — B-COMPLEX WITH VITAMIN C
1 TABLET ORAL DAILY
Qty: 30 TABLET | Refills: 0 | Status: CANCELLED | OUTPATIENT
Start: 2019-06-18 | End: 2020-06-17

## 2019-06-18 RX ORDER — OYSTER SHELL CALCIUM WITH VITAMIN D 500; 200 MG/1; [IU]/1
1 TABLET, FILM COATED ORAL 2 TIMES DAILY
Qty: 60 TABLET | Refills: 2 | OUTPATIENT
Start: 2019-06-18 | End: 2019-09-09 | Stop reason: SDUPTHER

## 2019-07-26 ENCOUNTER — OFFICE VISIT (OUTPATIENT)
Dept: INTERNAL MEDICINE CLINIC | Age: 84
End: 2019-07-26
Payer: MEDICARE

## 2019-07-26 VITALS
DIASTOLIC BLOOD PRESSURE: 62 MMHG | HEART RATE: 83 BPM | HEIGHT: 65 IN | BODY MASS INDEX: 19.29 KG/M2 | TEMPERATURE: 97.7 F | RESPIRATION RATE: 22 BRPM | OXYGEN SATURATION: 98 % | WEIGHT: 115.8 LBS | SYSTOLIC BLOOD PRESSURE: 110 MMHG

## 2019-07-26 DIAGNOSIS — F03.90 DEMENTIA WITHOUT BEHAVIORAL DISTURBANCE, UNSPECIFIED DEMENTIA TYPE: Primary | ICD-10-CM

## 2019-07-26 DIAGNOSIS — M81.0 AGE-RELATED OSTEOPOROSIS WITHOUT CURRENT PATHOLOGICAL FRACTURE: ICD-10-CM

## 2019-07-26 PROCEDURE — 99213 OFFICE O/P EST LOW 20 MIN: CPT | Performed by: STUDENT IN AN ORGANIZED HEALTH CARE EDUCATION/TRAINING PROGRAM

## 2019-07-26 RX ORDER — DONEPEZIL HYDROCHLORIDE 5 MG/1
5 TABLET, FILM COATED ORAL NIGHTLY
Qty: 30 TABLET | Refills: 0 | Status: SHIPPED | OUTPATIENT
Start: 2019-07-26 | End: 2019-08-19 | Stop reason: SDUPTHER

## 2019-07-26 RX ORDER — ALENDRONATE SODIUM 70 MG/1
70 TABLET ORAL
Qty: 12 TABLET | Refills: 1 | Status: SHIPPED | OUTPATIENT
Start: 2019-07-26 | End: 2020-01-10

## 2019-07-26 RX ORDER — DONEPEZIL HYDROCHLORIDE 5 MG/1
5 TABLET, FILM COATED ORAL NIGHTLY
Qty: 30 TABLET | Refills: 0 | Status: SHIPPED | OUTPATIENT
Start: 2019-07-26 | End: 2019-07-26

## 2019-07-26 NOTE — PATIENT INSTRUCTIONS
Please take Fosamax on an empty stomach and wait at least one hour before eating something and laying down. Please follow up in one month after starting aricept. Patient Education        Dementia: Care Instructions  Your Care Instructions    Dementia is a loss of mental skills that affects your daily life. It is different than the occasional trouble with memory that is part of aging. You may find it hard to remember things that you feel you should be able to remember. Or you may feel that your mind is just not working as well as usual.  Finding out that you have dementia is a shock. You may be afraid and worried about how the condition will change your life. Although there is no cure at this time, medicine may slow memory loss and improve thinking for a while. Other medicines may be able to help you sleep or cope with depression and behavior changes. Dementia often gets worse slowly. But it can get worse quickly. As dementia gets worse, it may become harder to do common things that take planning, like making a list and going shopping. Over time, the disease may make it hard for you to take care of yourself. Some people with dementia need others to help care for them. Dementia is different for everyone. You may be able to function well for a long time. In the early stage of the condition, you can do things at home to make life easier and safer. You also can keep doing your hobbies and other activities. Many people find comfort in planning now for their future needs. Follow-up care is a key part of your treatment and safety. Be sure to make and go to all appointments, and call your doctor if you are having problems. It's also a good idea to know your test results and keep a list of the medicines you take. How can you care for yourself at home? · Take your medicines exactly as prescribed. Call your doctor if you think you are having a problem with your medicine. · Eat healthy foods.  Eat lots of whole

## 2019-07-26 NOTE — PROGRESS NOTES
Son and daughter-in-law have moved patient in with them this week. She talks in her sleep. They state she has severe short term memory loss. She can't remember eating 5 minutes after a meal. she remembers her son and daughter in law and long term memory is good.

## 2019-08-19 RX ORDER — DONEPEZIL HYDROCHLORIDE 5 MG/1
5 TABLET, FILM COATED ORAL NIGHTLY
Qty: 30 TABLET | Refills: 0 | Status: SHIPPED | OUTPATIENT
Start: 2019-08-19 | End: 2019-08-26 | Stop reason: SDUPTHER

## 2019-08-26 ENCOUNTER — OFFICE VISIT (OUTPATIENT)
Dept: INTERNAL MEDICINE CLINIC | Age: 84
End: 2019-08-26
Payer: MEDICARE

## 2019-08-26 VITALS
SYSTOLIC BLOOD PRESSURE: 121 MMHG | WEIGHT: 114.5 LBS | RESPIRATION RATE: 16 BRPM | HEART RATE: 90 BPM | HEIGHT: 65 IN | DIASTOLIC BLOOD PRESSURE: 60 MMHG | BODY MASS INDEX: 19.08 KG/M2 | OXYGEN SATURATION: 96 % | TEMPERATURE: 97 F

## 2019-08-26 DIAGNOSIS — Z13.220 SCREENING CHOLESTEROL LEVEL: ICD-10-CM

## 2019-08-26 DIAGNOSIS — I10 ESSENTIAL HYPERTENSION: ICD-10-CM

## 2019-08-26 DIAGNOSIS — G30.9 ALZHEIMER'S DEMENTIA WITHOUT BEHAVIORAL DISTURBANCE, UNSPECIFIED TIMING OF DEMENTIA ONSET: Primary | ICD-10-CM

## 2019-08-26 DIAGNOSIS — F02.80 ALZHEIMER'S DEMENTIA WITHOUT BEHAVIORAL DISTURBANCE, UNSPECIFIED TIMING OF DEMENTIA ONSET: Primary | ICD-10-CM

## 2019-08-26 DIAGNOSIS — M81.0 OSTEOPOROSIS WITHOUT CURRENT PATHOLOGICAL FRACTURE, UNSPECIFIED OSTEOPOROSIS TYPE: ICD-10-CM

## 2019-08-26 DIAGNOSIS — J45.20 MILD INTERMITTENT ASTHMA WITHOUT COMPLICATION: ICD-10-CM

## 2019-08-26 PROBLEM — F03.90 DEMENTIA WITHOUT BEHAVIORAL DISTURBANCE (HCC): Status: ACTIVE | Noted: 2019-08-26

## 2019-08-26 PROCEDURE — G0009 ADMIN PNEUMOCOCCAL VACCINE: HCPCS | Performed by: STUDENT IN AN ORGANIZED HEALTH CARE EDUCATION/TRAINING PROGRAM

## 2019-08-26 PROCEDURE — 99213 OFFICE O/P EST LOW 20 MIN: CPT | Performed by: STUDENT IN AN ORGANIZED HEALTH CARE EDUCATION/TRAINING PROGRAM

## 2019-08-26 PROCEDURE — 90471 IMMUNIZATION ADMIN: CPT | Performed by: STUDENT IN AN ORGANIZED HEALTH CARE EDUCATION/TRAINING PROGRAM

## 2019-08-26 PROCEDURE — 90715 TDAP VACCINE 7 YRS/> IM: CPT | Performed by: STUDENT IN AN ORGANIZED HEALTH CARE EDUCATION/TRAINING PROGRAM

## 2019-08-26 PROCEDURE — 90670 PCV13 VACCINE IM: CPT | Performed by: STUDENT IN AN ORGANIZED HEALTH CARE EDUCATION/TRAINING PROGRAM

## 2019-08-26 PROCEDURE — 6360000002 HC RX W HCPCS: Performed by: STUDENT IN AN ORGANIZED HEALTH CARE EDUCATION/TRAINING PROGRAM

## 2019-08-26 RX ORDER — DONEPEZIL HYDROCHLORIDE 10 MG/1
10 TABLET, FILM COATED ORAL NIGHTLY
Qty: 90 TABLET | Refills: 0 | Status: SHIPPED | OUTPATIENT
Start: 2019-08-26 | End: 2019-11-19 | Stop reason: SDUPTHER

## 2019-08-26 RX ADMIN — TETANUS TOXOID, REDUCED DIPHTHERIA TOXOID AND ACELLULAR PERTUSSIS VACCINE, ADSORBED 0.5 ML: 5; 2.5; 8; 8; 2.5 SUSPENSION INTRAMUSCULAR at 11:08

## 2019-08-26 RX ADMIN — PNEUMOCOCCAL 13-VALENT CONJUGATE VACCINE 0.5 ML: 2.2; 2.2; 2.2; 2.2; 2.2; 4.4; 2.2; 2.2; 2.2; 2.2; 2.2; 2.2; 2.2 INJECTION, SUSPENSION INTRAMUSCULAR at 11:05

## 2019-08-26 ASSESSMENT — ENCOUNTER SYMPTOMS
ABDOMINAL PAIN: 0
CONSTIPATION: 0
SINUS PRESSURE: 0
BLOOD IN STOOL: 0
DIARRHEA: 0
COUGH: 0
SORE THROAT: 0
CHEST TIGHTNESS: 0
NAUSEA: 0
SHORTNESS OF BREATH: 0
VOMITING: 0
BACK PAIN: 0
SINUS PAIN: 0
COLOR CHANGE: 0
PHOTOPHOBIA: 0

## 2019-08-26 NOTE — PROGRESS NOTES
2019     Miles Arreguin (:  1935) is a 81 y.o. female, here for evaluation of the following medical concerns: follow up chronic medical problems    Patient, son and daughter in law present for today's visit. All report she is doing well. She is happier now that she is living with her son and daughter in law. Son and daughter do all the cooking for her. She is still able to dress herself and do her bathroom activities. They report she has good days and worse days. Seems to be more confused in the morning than at night, but overall they feel she has improved since moving in with them. Daughter in law would like to see her be more active throughout the day. Denies any falls. Eating well and drinks with meals but does not drink in between meals at all. Does endorse some occasional headaches. Review of Systems   Constitutional: Negative for appetite change, chills, fatigue and fever. HENT: Negative for congestion, sinus pressure, sinus pain, sneezing and sore throat. Eyes: Negative for photophobia and visual disturbance. Respiratory: Negative for cough, chest tightness and shortness of breath. Cardiovascular: Negative for chest pain, palpitations and leg swelling. Gastrointestinal: Negative for abdominal pain, blood in stool, constipation, diarrhea, nausea and vomiting. Genitourinary: Negative for dysuria, frequency and urgency. Musculoskeletal: Negative for back pain, neck pain and neck stiffness. Skin: Negative for color change, pallor and rash. Neurological: Positive for headaches. Negative for dizziness, weakness, light-headedness and numbness. Psychiatric/Behavioral: Positive for confusion. Prior to Visit Medications    Medication Sig Taking?  Authorizing Provider   donepezil (ARICEPT) 5 MG tablet Take 1 tablet by mouth nightly  Josh Jama MD   RESTASIS 0.05 % ophthalmic emulsion PLACE 1 DROP INTO BOTH EYES THREE TIMES DAILY  Josh Jama MD   alendronate (FOSAMAX) 70 MG tablet Take 1 tablet by mouth every 7 days  Sami Emerson MD   calcium-vitamin D (OSCAL-500) 500-200 MG-UNIT per tablet Take 1 tablet by mouth 2 times daily  Alec Martinez MD   amLODIPine (NORVASC) 5 MG tablet Take 1 tablet by mouth daily  Alec Martinez MD   Nutritional Supplements (ENSURE COMPLETE) LIQD Use daily  Alec Martinez MD   budesonide (PULMICORT FLEXHALER) 180 MCG/ACT AEPB inhaler Inhale 2 puffs into the lungs 2 times daily  Alec Martinez MD   ACETAMINOPHEN PO Take by mouth  Historical Provider, MD   albuterol sulfate HFA (VENTOLIN HFA) 108 (90 Base) MCG/ACT inhaler Inhale 2 puffs into the lungs every 6 hours as needed for Wheezing  Patient not taking: Reported on 2019  Jojo Torres MD        Social History     Tobacco Use    Smoking status: Former Smoker     Last attempt to quit: 1968     Years since quittin.9    Smokeless tobacco: Never Used   Substance Use Topics    Alcohol use: No        There were no vitals filed for this visit. Estimated body mass index is 19.27 kg/m² as calculated from the following:    Height as of 19: 5' 5\" (1.651 m). Weight as of 19: 115 lb 12.8 oz (52.5 kg). Physical Exam   Constitutional: She appears well-developed. No distress. Thin and cachetic appearing    HENT:   Head: Normocephalic and atraumatic. Mouth/Throat: Oropharynx is clear and moist. No oropharyngeal exudate. Eyes: Pupils are equal, round, and reactive to light. EOM are normal. Right eye exhibits no discharge. Left eye exhibits no discharge. No scleral icterus. Neck: Normal range of motion. Neck supple. Cardiovascular: Normal rate, regular rhythm and normal heart sounds. Exam reveals no gallop and no friction rub. No murmur heard. Pulmonary/Chest: Effort normal and breath sounds normal. No stridor. No respiratory distress. She has no wheezes. She has no rales. Abdominal: Soft. Bowel sounds are normal. She exhibits no distension.  There is no

## 2019-09-09 RX ORDER — OYSTER SHELL CALCIUM WITH VITAMIN D 500; 200 MG/1; [IU]/1
1 TABLET, FILM COATED ORAL 2 TIMES DAILY
Qty: 60 TABLET | Refills: 3 | Status: SHIPPED | OUTPATIENT
Start: 2019-09-09 | End: 2019-12-04 | Stop reason: SDUPTHER

## 2019-09-10 ENCOUNTER — TELEPHONE (OUTPATIENT)
Dept: INTERNAL MEDICINE CLINIC | Age: 84
End: 2019-09-10

## 2019-09-10 RX ORDER — CYCLOSPORINE 0.5 MG/ML
EMULSION OPHTHALMIC
Qty: 1 BOTTLE | Refills: 2 | Status: SHIPPED | OUTPATIENT
Start: 2019-09-10 | End: 2019-09-11 | Stop reason: SDUPTHER

## 2019-09-10 RX ORDER — DONEPEZIL HYDROCHLORIDE 10 MG/1
10 TABLET, FILM COATED ORAL NIGHTLY
Qty: 90 TABLET | Refills: 0 | Status: CANCELLED | OUTPATIENT
Start: 2019-09-10

## 2019-09-11 DIAGNOSIS — I10 ESSENTIAL HYPERTENSION: ICD-10-CM

## 2019-09-11 RX ORDER — AMLODIPINE BESYLATE 5 MG/1
5 TABLET ORAL DAILY
Qty: 90 TABLET | Refills: 0 | Status: SHIPPED | OUTPATIENT
Start: 2019-09-11 | End: 2019-12-04 | Stop reason: SDUPTHER

## 2019-09-11 RX ORDER — CYCLOSPORINE 0.5 MG/ML
EMULSION OPHTHALMIC
Qty: 3 BOTTLE | Refills: 0 | OUTPATIENT
Start: 2019-09-11 | End: 2019-12-04 | Stop reason: ALTCHOICE

## 2019-09-12 DIAGNOSIS — I10 ESSENTIAL HYPERTENSION: ICD-10-CM

## 2019-09-12 RX ORDER — AMLODIPINE BESYLATE 5 MG/1
TABLET ORAL
Qty: 90 TABLET | Refills: 0 | OUTPATIENT
Start: 2019-09-12

## 2019-09-13 ENCOUNTER — TELEPHONE (OUTPATIENT)
Dept: INTERNAL MEDICINE CLINIC | Age: 84
End: 2019-09-13

## 2019-09-13 NOTE — TELEPHONE ENCOUNTER
There is a gap payment that needs to paid for her insurance. So the insurance is not wanting to pay for the restatis until that payment is made. Wants to know if he can have it switched to a generic or something else.

## 2019-11-19 RX ORDER — DONEPEZIL HYDROCHLORIDE 10 MG/1
10 TABLET, FILM COATED ORAL NIGHTLY
Qty: 90 TABLET | Refills: 0 | Status: SHIPPED | OUTPATIENT
Start: 2019-11-19 | End: 2019-12-04 | Stop reason: SDUPTHER

## 2019-12-04 ENCOUNTER — OFFICE VISIT (OUTPATIENT)
Dept: INTERNAL MEDICINE CLINIC | Age: 84
End: 2019-12-04
Payer: MEDICARE

## 2019-12-04 VITALS
TEMPERATURE: 97.4 F | OXYGEN SATURATION: 95 % | RESPIRATION RATE: 24 BRPM | SYSTOLIC BLOOD PRESSURE: 114 MMHG | WEIGHT: 113.8 LBS | HEIGHT: 65 IN | DIASTOLIC BLOOD PRESSURE: 64 MMHG | HEART RATE: 80 BPM | BODY MASS INDEX: 18.96 KG/M2

## 2019-12-04 DIAGNOSIS — G30.9 ALZHEIMER'S DEMENTIA WITHOUT BEHAVIORAL DISTURBANCE, UNSPECIFIED TIMING OF DEMENTIA ONSET: Primary | ICD-10-CM

## 2019-12-04 DIAGNOSIS — I10 ESSENTIAL HYPERTENSION: ICD-10-CM

## 2019-12-04 DIAGNOSIS — M81.0 OSTEOPOROSIS WITHOUT CURRENT PATHOLOGICAL FRACTURE, UNSPECIFIED OSTEOPOROSIS TYPE: ICD-10-CM

## 2019-12-04 DIAGNOSIS — J45.20 MILD INTERMITTENT ASTHMA WITHOUT COMPLICATION: Chronic | ICD-10-CM

## 2019-12-04 DIAGNOSIS — Z23 NEEDS FLU SHOT: ICD-10-CM

## 2019-12-04 DIAGNOSIS — R41.0 CONFUSION: ICD-10-CM

## 2019-12-04 DIAGNOSIS — F02.80 ALZHEIMER'S DEMENTIA WITHOUT BEHAVIORAL DISTURBANCE, UNSPECIFIED TIMING OF DEMENTIA ONSET: Primary | ICD-10-CM

## 2019-12-04 PROCEDURE — 6360000002 HC RX W HCPCS: Performed by: STUDENT IN AN ORGANIZED HEALTH CARE EDUCATION/TRAINING PROGRAM

## 2019-12-04 PROCEDURE — G0008 ADMIN INFLUENZA VIRUS VAC: HCPCS | Performed by: STUDENT IN AN ORGANIZED HEALTH CARE EDUCATION/TRAINING PROGRAM

## 2019-12-04 PROCEDURE — 90686 IIV4 VACC NO PRSV 0.5 ML IM: CPT | Performed by: STUDENT IN AN ORGANIZED HEALTH CARE EDUCATION/TRAINING PROGRAM

## 2019-12-04 PROCEDURE — 99213 OFFICE O/P EST LOW 20 MIN: CPT | Performed by: STUDENT IN AN ORGANIZED HEALTH CARE EDUCATION/TRAINING PROGRAM

## 2019-12-04 RX ORDER — OYSTER SHELL CALCIUM WITH VITAMIN D 500; 200 MG/1; [IU]/1
1 TABLET, FILM COATED ORAL 2 TIMES DAILY
Qty: 60 TABLET | Refills: 3 | Status: SHIPPED | OUTPATIENT
Start: 2019-12-04 | End: 2020-03-05

## 2019-12-04 RX ORDER — AMLODIPINE BESYLATE 5 MG/1
5 TABLET ORAL DAILY
Qty: 90 TABLET | Refills: 0 | Status: SHIPPED | OUTPATIENT
Start: 2019-12-04 | End: 2020-04-03 | Stop reason: SDUPTHER

## 2019-12-04 RX ORDER — DONEPEZIL HYDROCHLORIDE 10 MG/1
10 TABLET, FILM COATED ORAL NIGHTLY
Qty: 90 TABLET | Refills: 0 | Status: SHIPPED | OUTPATIENT
Start: 2019-12-04 | End: 2020-04-03 | Stop reason: SDUPTHER

## 2019-12-04 RX ADMIN — INFLUENZA A VIRUS A/BRISBANE/02/2018 IVR-190 (H1N1) ANTIGEN (PROPIOLACTONE INACTIVATED), INFLUENZA A VIRUS A/KANSAS/14/2017 X-327 (H3N2) ANTIGEN (PROPIOLACTONE INACTIVATED), INFLUENZA B VIRUS B/MARYLAND/15/2016 ANTIGEN (PROPIOLACTONE INACTIVATED), INFLUENZA B VIRUS B/PHUKET/3073/2013 BVR-1B ANTIGEN (PROPIOLACTONE INACTIVATED) 0.5 ML: 15; 15; 15; 15 INJECTION, SUSPENSION INTRAMUSCULAR at 16:40

## 2019-12-04 ASSESSMENT — PATIENT HEALTH QUESTIONNAIRE - PHQ9
2. FEELING DOWN, DEPRESSED OR HOPELESS: 0
SUM OF ALL RESPONSES TO PHQ9 QUESTIONS 1 & 2: 0
1. LITTLE INTEREST OR PLEASURE IN DOING THINGS: 0
SUM OF ALL RESPONSES TO PHQ QUESTIONS 1-9: 0
SUM OF ALL RESPONSES TO PHQ QUESTIONS 1-9: 0

## 2019-12-17 ENCOUNTER — TELEPHONE (OUTPATIENT)
Dept: INTERNAL MEDICINE CLINIC | Age: 84
End: 2019-12-17

## 2020-03-23 ENCOUNTER — HOSPITAL ENCOUNTER (OUTPATIENT)
Dept: GENERAL RADIOLOGY | Age: 85
Discharge: HOME | End: 2020-03-23
Payer: MEDICARE

## 2020-03-23 ENCOUNTER — HOSPITAL ENCOUNTER (OUTPATIENT)
Age: 85
Discharge: HOME | End: 2020-03-23
Payer: MEDICARE

## 2020-03-23 ENCOUNTER — TELEPHONE (OUTPATIENT)
Dept: INTERNAL MEDICINE CLINIC | Age: 85
End: 2020-03-23

## 2020-03-23 PROCEDURE — 71046 X-RAY EXAM CHEST 2 VIEWS: CPT

## 2020-03-26 PROBLEM — J40 BRONCHITIS: Status: ACTIVE | Noted: 2020-03-26

## 2020-04-01 ENCOUNTER — TELEPHONE (OUTPATIENT)
Dept: INTERNAL MEDICINE CLINIC | Age: 85
End: 2020-04-01

## 2020-04-01 DIAGNOSIS — R41.0 CONFUSION: ICD-10-CM

## 2020-04-01 DIAGNOSIS — I10 ESSENTIAL HYPERTENSION: ICD-10-CM

## 2020-04-01 LAB
A/G RATIO: 1.5 (ref 1.1–2.2)
ALBUMIN SERPL-MCNC: 4.1 G/DL (ref 3.4–5)
ALP BLD-CCNC: 71 U/L (ref 40–129)
ALT SERPL-CCNC: 12 U/L (ref 10–40)
ANION GAP SERPL CALCULATED.3IONS-SCNC: 11 MMOL/L (ref 3–16)
AST SERPL-CCNC: 14 U/L (ref 15–37)
BASOPHILS ABSOLUTE: 0 K/UL (ref 0–0.2)
BASOPHILS RELATIVE PERCENT: 1 %
BILIRUB SERPL-MCNC: 0.5 MG/DL (ref 0–1)
BUN BLDV-MCNC: 10 MG/DL (ref 7–20)
CALCIUM SERPL-MCNC: 9.9 MG/DL (ref 8.3–10.6)
CHLORIDE BLD-SCNC: 102 MMOL/L (ref 99–110)
CHOLESTEROL, TOTAL: 174 MG/DL (ref 0–199)
CO2: 29 MMOL/L (ref 21–32)
CREAT SERPL-MCNC: 0.6 MG/DL (ref 0.6–1.2)
EOSINOPHILS ABSOLUTE: 0.1 K/UL (ref 0–0.6)
EOSINOPHILS RELATIVE PERCENT: 2.8 %
GFR AFRICAN AMERICAN: >60
GFR NON-AFRICAN AMERICAN: >60
GLOBULIN: 2.8 G/DL
GLUCOSE BLD-MCNC: 83 MG/DL (ref 70–99)
HCT VFR BLD CALC: 41.2 % (ref 36–48)
HDLC SERPL-MCNC: 64 MG/DL (ref 40–60)
HEMOGLOBIN: 13.4 G/DL (ref 12–16)
LDL CHOLESTEROL CALCULATED: 91 MG/DL
LYMPHOCYTES ABSOLUTE: 1.6 K/UL (ref 1–5.1)
LYMPHOCYTES RELATIVE PERCENT: 38.4 %
MCH RBC QN AUTO: 30.6 PG (ref 26–34)
MCHC RBC AUTO-ENTMCNC: 32.6 G/DL (ref 31–36)
MCV RBC AUTO: 93.7 FL (ref 80–100)
MONOCYTES ABSOLUTE: 0.2 K/UL (ref 0–1.3)
MONOCYTES RELATIVE PERCENT: 6.1 %
NEUTROPHILS ABSOLUTE: 2.1 K/UL (ref 1.7–7.7)
NEUTROPHILS RELATIVE PERCENT: 51.7 %
PDW BLD-RTO: 12.2 % (ref 12.4–15.4)
PLATELET # BLD: 249 K/UL (ref 135–450)
PMV BLD AUTO: 8.7 FL (ref 5–10.5)
POTASSIUM SERPL-SCNC: 4.2 MMOL/L (ref 3.5–5.1)
RBC # BLD: 4.39 M/UL (ref 4–5.2)
SODIUM BLD-SCNC: 142 MMOL/L (ref 136–145)
TOTAL PROTEIN: 6.9 G/DL (ref 6.4–8.2)
TRIGL SERPL-MCNC: 94 MG/DL (ref 0–150)
VLDLC SERPL CALC-MCNC: 19 MG/DL
WBC # BLD: 4.1 K/UL (ref 4–11)

## 2020-04-03 ENCOUNTER — OFFICE VISIT (OUTPATIENT)
Dept: INTERNAL MEDICINE CLINIC | Age: 85
End: 2020-04-03
Payer: MEDICARE

## 2020-04-03 VITALS
SYSTOLIC BLOOD PRESSURE: 104 MMHG | OXYGEN SATURATION: 97 % | HEART RATE: 64 BPM | HEIGHT: 65 IN | DIASTOLIC BLOOD PRESSURE: 61 MMHG | BODY MASS INDEX: 18.26 KG/M2 | TEMPERATURE: 98.1 F | RESPIRATION RATE: 20 BRPM | WEIGHT: 109.6 LBS

## 2020-04-03 PROCEDURE — 99213 OFFICE O/P EST LOW 20 MIN: CPT | Performed by: STUDENT IN AN ORGANIZED HEALTH CARE EDUCATION/TRAINING PROGRAM

## 2020-04-03 RX ORDER — ALENDRONATE SODIUM 70 MG/1
TABLET ORAL
Qty: 12 TABLET | Refills: 1 | Status: SHIPPED | OUTPATIENT
Start: 2020-04-03 | End: 2020-09-11

## 2020-04-03 RX ORDER — FLUTICASONE PROPIONATE 50 MCG
2 SPRAY, SUSPENSION (ML) NASAL DAILY
Qty: 1 BOTTLE | Refills: 0 | Status: SHIPPED | OUTPATIENT
Start: 2020-04-03 | End: 2020-04-27

## 2020-04-03 RX ORDER — AMLODIPINE BESYLATE 5 MG/1
5 TABLET ORAL DAILY
Qty: 90 TABLET | Refills: 0 | Status: SHIPPED | OUTPATIENT
Start: 2020-04-03 | End: 2020-08-31

## 2020-04-03 RX ORDER — DONEPEZIL HYDROCHLORIDE 10 MG/1
10 TABLET, FILM COATED ORAL NIGHTLY
Qty: 90 TABLET | Refills: 0 | Status: SHIPPED | OUTPATIENT
Start: 2020-04-03 | End: 2020-08-31

## 2020-04-03 RX ORDER — LORATADINE 10 MG/1
10 TABLET ORAL DAILY
Qty: 30 TABLET | Refills: 0 | Status: SHIPPED | OUTPATIENT
Start: 2020-04-03 | End: 2020-04-27

## 2020-04-03 ASSESSMENT — ENCOUNTER SYMPTOMS
APNEA: 0
EYE REDNESS: 0
GASTROINTESTINAL NEGATIVE: 1
CHOKING: 0
EYES NEGATIVE: 1
SORE THROAT: 0
RHINORRHEA: 1
WHEEZING: 0
COUGH: 1
TROUBLE SWALLOWING: 0
SINUS PAIN: 0
SHORTNESS OF BREATH: 0
STRIDOR: 0
VOICE CHANGE: 0
CHEST TIGHTNESS: 0
SINUS PRESSURE: 1

## 2020-04-03 NOTE — PATIENT INSTRUCTIONS
Allergic rhinitis  - robitussin DM for cough  - complete doxycycline  - take claritin 10 mg PO qD  - maintain good hydration  - humidifier in the house  - flonase     Patient Education        Rhinitis: Care Instructions  Your Care Instructions  Rhinitis is swelling and irritation in the nose. Allergies and infections are often the cause. Your nose may run or feel stuffy. Other symptoms are itchy and sore eyes, ears, throat, and mouth. If allergies are the cause, your doctor may do tests to find out what you are allergic to. You may be able to stop symptoms if you avoid the things that cause them. Your doctor may suggest or prescribe medicine to ease your symptoms. Follow-up care is a key part of your treatment and safety. Be sure to make and go to all appointments, and call your doctor if you are having problems. It's also a good idea to know your test results and keep a list of the medicines you take. How can you care for yourself at home? · If your rhinitis is caused by allergies, try to find out what sets off (triggers) your symptoms. Take steps to avoid these triggers. ? Avoid yard work. It can stir up both pollen and mold. ? Do not smoke or allow others to smoke around you. If you need help quitting, talk to your doctor about stop-smoking programs and medicines. These can increase your chances of quitting for good. ? Do not use aerosol sprays, cleaning products, or perfumes. ? If pollen is one of your triggers, close your house and car windows during blooming season. ? Clean your house often to control dust.  ? Keep pets outside. · If your doctor recommends over-the-counter medicines to relieve symptoms, take your medicines exactly as prescribed. Call your doctor if you think you are having a problem with your medicine. · Use saline (saltwater) nasal washes to help keep your nasal passages open and wash out mucus and bacteria. You can buy saline nose drops at a grocery store or drugstore.  Or you can

## 2020-04-03 NOTE — PROGRESS NOTES
Outpatient Clinic Established Patient Note    Patient: Miky Cano  : 1935 (77 y.o.)  Date: 4/3/2020    CC: Follow up cough, sinus headache    HPI:      Miky Cano is a 82 yo female with hx of Alzheimer's dementia, HTN, osteoporosis and asthma who presents for a follow dementia. She has been having mild non-productive cough and congestion for the past 10 days. She got lab work and CXR through [facility] flu clinic on  which did not identify infection. CXR idenitfied hyperinflated lungs. She was prescribed doxycycline for bronchitis and started taking day 1/5 yesterday with improvement. She has vasomotor rhinitis, cough and rhinorrhea. She has seasonal allergies worse in the spring. No one had fever and no sick contacts. She has frontal headache and sinus congestion. Asthma is well controlled without rescue inhaler and she uses pulmicort every morning. Home Meds:  Prior to Visit Medications    Medication Sig Taking?  Authorizing Provider   amLODIPine (NORVASC) 5 MG tablet Take 1 tablet by mouth daily Yes Fuad Rosales MD   donepezil (ARICEPT) 10 MG tablet Take 1 tablet by mouth nightly Yes Fuad Rosales MD   alendronate (FOSAMAX) 70 MG tablet TAKE 1 TABLET BY MOUTH ONE TIME PER WEEK Yes Fuad Rosales MD   fluticasone (FLONASE) 50 MCG/ACT nasal spray 2 sprays by Each Nostril route daily Yes Fuad Rosales MD   loratadine (CLARITIN) 10 MG tablet Take 1 tablet by mouth daily Yes Fuad Rosales MD   Calcium Carb-Cholecalciferol (CALCIUM-VITAMIN D) 500-200 MG-UNIT per tablet TAKE 1 TABLET BY MOUTH TWICE A DAY  Day Haro,    Nutritional Supplements (ENSURE COMPLETE) LIQD Use daily  Amarilis Mandujano MD   budesonide (PULMICORT FLEXHALER) 180 MCG/ACT AEPB inhaler Inhale 2 puffs into the lungs 2 times daily  Amarilis Mandujano MD   ACETAMINOPHEN PO Take by mouth  Historical Provider, MD   albuterol sulfate HFA (VENTOLIN HFA) 108 (90 Base) MCG/ACT inhaler Inhale 2 puffs into the lungs every 6 hours as needed for Wheezing  Patient not taking: Reported on 12/4/2019  Peña Gonzalez MD       Allergies:    Aspirin; Codeine; Pcn [penicillins]; Prednisone; and Zyban [bupropion hcl]    Health Maintenance Due   Topic Date Due    Shingles Vaccine (1 of 2) 05/09/1986    Annual Wellness Visit (AWV)  06/18/2019       Immunization History   Administered Date(s) Administered    Influenza, Quadv, IM, PF (6 mo and older Fluzone, Flulaval, Fluarix, and 3 yrs and older Afluria) 12/04/2019    Pneumococcal Conjugate 13-valent (Radha Jesika) 08/26/2019    Tdap (Boostrix, Adacel) 08/26/2019       Review of Systems   Constitutional: Negative for chills, diaphoresis, fatigue and fever. HENT: Positive for congestion, postnasal drip, rhinorrhea and sinus pressure. Negative for dental problem, drooling, ear pain, hearing loss, mouth sores, nosebleeds, sinus pain, sneezing, sore throat, tinnitus, trouble swallowing and voice change. Eyes: Negative. Negative for redness and visual disturbance. Respiratory: Positive for cough. Negative for apnea, choking, chest tightness, shortness of breath, wheezing and stridor. Cardiovascular: Negative for chest pain, palpitations and leg swelling. Gastrointestinal: Negative. Genitourinary: Negative. Negative for dysuria. Musculoskeletal: Negative. Skin: Negative. Neurological: Positive for headaches. Negative for dizziness and light-headedness. Psychiatric/Behavioral: Negative. A 10-organ Review Of Systems was obtained and otherwise unremarkable except as per HPI. Data: Old records have been reviewed electronically. PHYSICAL EXAM:  There were no vitals taken for this visit. Physical Exam  Constitutional:       Appearance: Normal appearance. HENT:      Head: Normocephalic and atraumatic. Right Ear: Tympanic membrane, ear canal and external ear normal. There is impacted cerumen.       Left Ear: Tympanic membrane, ear canal and external ear normal. There is impacted

## 2020-05-14 RX ORDER — FLUTICASONE PROPIONATE 50 MCG
SPRAY, SUSPENSION (ML) NASAL
Qty: 1 BOTTLE | Refills: 1 | Status: SHIPPED | OUTPATIENT
Start: 2020-05-14 | End: 2021-06-30

## 2020-08-31 RX ORDER — AMLODIPINE BESYLATE 5 MG/1
TABLET ORAL
Qty: 90 TABLET | Refills: 0 | Status: SHIPPED | OUTPATIENT
Start: 2020-08-31 | End: 2020-12-15

## 2020-08-31 RX ORDER — DONEPEZIL HYDROCHLORIDE 10 MG/1
TABLET, FILM COATED ORAL
Qty: 90 TABLET | Refills: 0 | Status: SHIPPED | OUTPATIENT
Start: 2020-08-31 | End: 2020-12-03

## 2020-09-11 RX ORDER — ALENDRONATE SODIUM 70 MG/1
TABLET ORAL
Qty: 12 TABLET | Refills: 1 | Status: SHIPPED | OUTPATIENT
Start: 2020-09-11 | End: 2020-11-17 | Stop reason: SDUPTHER

## 2020-11-17 RX ORDER — ALENDRONATE SODIUM 70 MG/1
TABLET ORAL
Qty: 12 TABLET | Refills: 0 | Status: SHIPPED | OUTPATIENT
Start: 2020-11-17 | End: 2021-02-18

## 2020-12-03 RX ORDER — DONEPEZIL HYDROCHLORIDE 10 MG/1
TABLET, FILM COATED ORAL
Qty: 90 TABLET | Refills: 0 | Status: SHIPPED | OUTPATIENT
Start: 2020-12-03 | End: 2021-01-26

## 2020-12-21 RX ORDER — AMLODIPINE BESYLATE 5 MG/1
TABLET ORAL
Qty: 90 TABLET | Refills: 1 | OUTPATIENT
Start: 2020-12-21

## 2021-01-26 ENCOUNTER — OFFICE VISIT (OUTPATIENT)
Dept: INTERNAL MEDICINE CLINIC | Age: 86
End: 2021-01-26
Payer: MEDICARE

## 2021-01-26 VITALS
HEIGHT: 64 IN | OXYGEN SATURATION: 94 % | WEIGHT: 107.1 LBS | DIASTOLIC BLOOD PRESSURE: 72 MMHG | TEMPERATURE: 97.8 F | BODY MASS INDEX: 18.28 KG/M2 | HEART RATE: 81 BPM | SYSTOLIC BLOOD PRESSURE: 126 MMHG

## 2021-01-26 DIAGNOSIS — F02.80 ALZHEIMER'S DEMENTIA WITHOUT BEHAVIORAL DISTURBANCE, UNSPECIFIED TIMING OF DEMENTIA ONSET: ICD-10-CM

## 2021-01-26 DIAGNOSIS — J45.20 MILD INTERMITTENT ASTHMA WITHOUT COMPLICATION: Chronic | ICD-10-CM

## 2021-01-26 DIAGNOSIS — M81.0 OSTEOPOROSIS WITHOUT CURRENT PATHOLOGICAL FRACTURE, UNSPECIFIED OSTEOPOROSIS TYPE: Chronic | ICD-10-CM

## 2021-01-26 DIAGNOSIS — I10 ESSENTIAL HYPERTENSION: ICD-10-CM

## 2021-01-26 DIAGNOSIS — G30.9 ALZHEIMER'S DEMENTIA WITHOUT BEHAVIORAL DISTURBANCE, UNSPECIFIED TIMING OF DEMENTIA ONSET: ICD-10-CM

## 2021-01-26 DIAGNOSIS — Z23 NEED FOR PROPHYLACTIC VACCINATION AGAINST STREPTOCOCCUS PNEUMONIAE (PNEUMOCOCCUS): Primary | ICD-10-CM

## 2021-01-26 PROCEDURE — 90732 PPSV23 VACC 2 YRS+ SUBQ/IM: CPT | Performed by: STUDENT IN AN ORGANIZED HEALTH CARE EDUCATION/TRAINING PROGRAM

## 2021-01-26 PROCEDURE — 6360000002 HC RX W HCPCS: Performed by: STUDENT IN AN ORGANIZED HEALTH CARE EDUCATION/TRAINING PROGRAM

## 2021-01-26 PROCEDURE — G0009 ADMIN PNEUMOCOCCAL VACCINE: HCPCS | Performed by: STUDENT IN AN ORGANIZED HEALTH CARE EDUCATION/TRAINING PROGRAM

## 2021-01-26 PROCEDURE — 99213 OFFICE O/P EST LOW 20 MIN: CPT | Performed by: STUDENT IN AN ORGANIZED HEALTH CARE EDUCATION/TRAINING PROGRAM

## 2021-01-26 RX ORDER — DONEPEZIL HYDROCHLORIDE 23 MG/1
23 TABLET, FILM COATED ORAL NIGHTLY
Qty: 30 TABLET | Refills: 1 | Status: SHIPPED | OUTPATIENT
Start: 2021-01-26 | End: 2021-02-09

## 2021-01-26 RX ADMIN — PNEUMOCOCCAL VACCINE POLYVALENT 0.5 ML
25; 25; 25; 25; 25; 25; 25; 25; 25; 25; 25; 25; 25; 25; 25; 25; 25; 25; 25; 25; 25; 25; 25 INJECTION, SOLUTION INTRAMUSCULAR; SUBCUTANEOUS at 14:27

## 2021-01-26 ASSESSMENT — PATIENT HEALTH QUESTIONNAIRE - PHQ9
SUM OF ALL RESPONSES TO PHQ9 QUESTIONS 1 & 2: 0
1. LITTLE INTEREST OR PLEASURE IN DOING THINGS: 0
SUM OF ALL RESPONSES TO PHQ QUESTIONS 1-9: 0
2. FEELING DOWN, DEPRESSED OR HOPELESS: 0
SUM OF ALL RESPONSES TO PHQ QUESTIONS 1-9: 0

## 2021-01-26 NOTE — ASSESSMENT & PLAN NOTE
Seems like progression of her dementia  - will check TSH, vitamin D  - increase donepezil to 23 mg daily

## 2021-01-26 NOTE — PROGRESS NOTES
Outpatient Clinic Established Patient Note    Patient: Montel Kanner  : 1935 (12 y.o.)  Date: 2021    CC: Follow up    HPI:      81 F PMH alzheimer's dementia, HTN, osteoporosis and asthma who presents for follow up. Patient was seen previously in 2020. Alzheimer's dementia - on donepezil, has been compliant. Since her last visit, she has gradually been having decreased ability to read and comprehend. She has also been having psychomotor slowing. Son reports she has good sleep, appetite, energy levels, and is not withdrawn. HTN - BP well controlled, compliant with meds. Asthma - well controlled, uses pulmicort everyday, uses albuterol hardly once a month. Osteoporosis - no recent falls, adherent with fosfamax. Received COVID-19 vaccine on 21, no side effects reported. Home Meds:  Prior to Visit Medications    Medication Sig Taking?  Authorizing Provider   amLODIPine (NORVASC) 5 MG tablet TAKE 1 TABLET BY MOUTH EVERY DAY Yes Orin Nye MD   donepezil (ARICEPT) 10 MG tablet TAKE 1 TABLET BY MOUTH EVERY DAY AT NIGHT Yes Orin Nye MD   alendronate (FOSAMAX) 70 MG tablet One tablet per week Yes Tomasa Severin, MD   Calcium Carb-Cholecalciferol (CALCIUM-VITAMIN D) 500-200 MG-UNIT per tablet TAKE 1 TABLET BY MOUTH TWICE A DAY Yes Ronny Harris MD   fluticasone (FLONASE) 50 MCG/ACT nasal spray SPRAY 2 SPRAYS INTO EACH NOSTRIL EVERY DAY Yes Tomasa Severin, MD   budesonide (PULMICORT FLEXHALER) 180 MCG/ACT AEPB inhaler Inhale 2 puffs into the lungs 2 times daily Yes Kandice Rapp MD   ACETAMINOPHEN PO Take by mouth Yes Historical Provider, MD   albuterol sulfate HFA (VENTOLIN HFA) 108 (90 Base) MCG/ACT inhaler Inhale 2 puffs into the lungs every 6 hours as needed for Wheezing Yes Michaela Wisdom MD   loratadine (CLARITIN) 10 MG tablet TAKE 1 TABLET BY MOUTH EVERY DAY  Patient not taking: Reported on 2021  Dalton Bowen MD Nutritional Supplements (ENSURE COMPLETE) LIQD Use daily  Patient not taking: Reported on 1/26/2021  Symone Naranjo MD       Allergies:    Aspirin, Codeine, Pcn [penicillins], Prednisone, and Zyban [bupropion hcl]    Health Maintenance Due   Topic Date Due    Shingles Vaccine (1 of 2) 05/09/1986    Annual Wellness Visit (AWV)  06/18/2019    Pneumococcal 65+ years Vaccine (2 of 2 - PPSV23) 08/26/2020    Flu vaccine (1) 09/01/2020       Immunization History   Administered Date(s) Administered    Influenza, Quadv, IM, PF (6 mo and older Fluzone, Flulaval, Fluarix, and 3 yrs and older Afluria) 12/04/2019    Pneumococcal Conjugate 13-valent (Melissa Sins) 08/26/2019    Tdap (Boostrix, Adacel) 08/26/2019       Review of Systems  A 10-organ Review Of Systems was obtained and otherwise unremarkable except as per HPI. Data: Old records have been reviewed electronically. PHYSICAL EXAM:  /72 (Site: Right Upper Arm, Position: Sitting, Cuff Size: Small Adult)   Pulse 81   Temp 97.8 °F (36.6 °C) (Temporal)   Ht 5' 4\" (1.626 m)   Wt 107 lb 1.6 oz (48.6 kg)   SpO2 94%   BMI 18.38 kg/m²   Physical Exam  Vitals signs reviewed. Constitutional:       General: She is not in acute distress. Appearance: Normal appearance. She is not ill-appearing or toxic-appearing. Cardiovascular:      Rate and Rhythm: Normal rate and regular rhythm. Heart sounds: No murmur. No friction rub. No gallop. Pulmonary:      Effort: Pulmonary effort is normal. No respiratory distress. Breath sounds: Normal breath sounds. No wheezing, rhonchi or rales. Abdominal:      General: Bowel sounds are normal. There is no distension. Palpations: Abdomen is soft. Tenderness: There is no abdominal tenderness. There is no guarding or rebound. Musculoskeletal:      Right lower leg: No edema. Left lower leg: No edema. Neurological:      Mental Status: She is alert.          Assessment & Plan: Alzheimer's dementia without behavioral disturbance  Seems like progression of her dementia  - will check TSH, vitamin D  - increase donepezil to 23 mg daily      Essential hypertension  Stable  - continue amlodipine 5 mg daily    Osteoporosis  Last DEXA scan 2019  Will continue fosfamax, calcium-vit D supplements    Asthma  Stable  -continue budesonide  -continue albuterol PRN      Health maintenance:  Influenza vaccine - needs high dose, advised to get at local pharmacy  PPSV 23 - will administer a dose today  COVID-19 vaccine - received 1/24/21    Return in about 3 months (around 4/26/2021).     Dispo: Pt has been staffed with Dr. Gennaro Montez  _______________  Elaine Rasheed MD, 1/26/2021 1:57 PM   PGY-2

## 2021-01-28 ENCOUNTER — TELEPHONE (OUTPATIENT)
Dept: INTERNAL MEDICINE CLINIC | Age: 86
End: 2021-01-28

## 2021-01-28 NOTE — TELEPHONE ENCOUNTER
Question about labs. Can he go to [facility] . It is very close to her house. I told him to be sure they fax the results to us . States he has the clinic fax number.

## 2021-02-05 ENCOUNTER — TELEPHONE (OUTPATIENT)
Dept: INTERNAL MEDICINE CLINIC | Age: 86
End: 2021-02-05

## 2021-02-09 DIAGNOSIS — G30.9 ALZHEIMER'S DEMENTIA WITHOUT BEHAVIORAL DISTURBANCE, UNSPECIFIED TIMING OF DEMENTIA ONSET: ICD-10-CM

## 2021-02-09 DIAGNOSIS — F02.80 ALZHEIMER'S DEMENTIA WITHOUT BEHAVIORAL DISTURBANCE, UNSPECIFIED TIMING OF DEMENTIA ONSET: ICD-10-CM

## 2021-02-09 RX ORDER — DONEPEZIL HYDROCHLORIDE 10 MG/1
20 TABLET, FILM COATED ORAL NIGHTLY
Qty: 30 TABLET | Refills: 0 | Status: SHIPPED | OUTPATIENT
Start: 2021-02-09 | End: 2021-02-26 | Stop reason: SDUPTHER

## 2021-02-18 DIAGNOSIS — M81.0 AGE-RELATED OSTEOPOROSIS WITHOUT CURRENT PATHOLOGICAL FRACTURE: ICD-10-CM

## 2021-02-18 NOTE — TELEPHONE ENCOUNTER
Last OV - 01/26/2021 - Dr. Fletcher Roberts  Next [address] - 05/04/2021 - Dr. Fletcher Roberts    Alendronate last filled - 11/17/2020 - 0 rf

## 2021-02-26 DIAGNOSIS — F02.80 ALZHEIMER'S DEMENTIA WITHOUT BEHAVIORAL DISTURBANCE, UNSPECIFIED TIMING OF DEMENTIA ONSET: ICD-10-CM

## 2021-02-26 DIAGNOSIS — G30.9 ALZHEIMER'S DEMENTIA WITHOUT BEHAVIORAL DISTURBANCE, UNSPECIFIED TIMING OF DEMENTIA ONSET: ICD-10-CM

## 2021-02-26 RX ORDER — DONEPEZIL HYDROCHLORIDE 10 MG/1
20 TABLET, FILM COATED ORAL NIGHTLY
Qty: 30 TABLET | Refills: 3 | Status: SHIPPED | OUTPATIENT
Start: 2021-02-26 | End: 2021-05-20

## 2021-02-26 RX ORDER — ALENDRONATE SODIUM 70 MG/1
TABLET ORAL
Qty: 12 TABLET | Refills: 3 | Status: SHIPPED | OUTPATIENT
Start: 2021-02-26 | End: 2022-01-31

## 2021-03-23 DIAGNOSIS — I10 ESSENTIAL HYPERTENSION: ICD-10-CM

## 2021-03-23 RX ORDER — AMLODIPINE BESYLATE 5 MG/1
TABLET ORAL
Qty: 30 TABLET | Refills: 1 | Status: SHIPPED | OUTPATIENT
Start: 2021-03-23 | End: 2021-04-21

## 2021-04-21 DIAGNOSIS — I10 ESSENTIAL HYPERTENSION: ICD-10-CM

## 2021-04-21 NOTE — TELEPHONE ENCOUNTER
Last OV - 01/26/2021 - Dr. Jair Lua  Next [address] - 05/04/2021 - Dr. Alex Plascencia    Amlodipine last filled  03/23/2021 - 1 rf

## 2021-04-22 RX ORDER — AMLODIPINE BESYLATE 5 MG/1
TABLET ORAL
Qty: 30 TABLET | Refills: 1 | Status: SHIPPED | OUTPATIENT
Start: 2021-04-22 | End: 2021-05-18

## 2021-04-24 DIAGNOSIS — M81.0 OSTEOPOROSIS WITHOUT CURRENT PATHOLOGICAL FRACTURE, UNSPECIFIED OSTEOPOROSIS TYPE: ICD-10-CM

## 2021-04-26 NOTE — TELEPHONE ENCOUNTER
Last OV - 01/26/2021 - Dr. Sari Drake  Next [facility] - 05/04/2021 - Dr. Alen Small last filled  11/13/2020 - 1 rf

## 2021-04-27 RX ORDER — CALCIUM CARBONATE/VITAMIN D3 500MG-5MCG
TABLET ORAL
Qty: 180 TABLET | Refills: 5 | Status: SHIPPED | OUTPATIENT
Start: 2021-04-27 | End: 2022-06-02 | Stop reason: SDUPTHER

## 2021-05-18 DIAGNOSIS — I10 ESSENTIAL HYPERTENSION: ICD-10-CM

## 2021-05-18 RX ORDER — AMLODIPINE BESYLATE 5 MG/1
TABLET ORAL
Qty: 30 TABLET | Refills: 1 | Status: SHIPPED | OUTPATIENT
Start: 2021-05-18 | End: 2021-10-04 | Stop reason: SDUPTHER

## 2021-05-20 DIAGNOSIS — F02.80 ALZHEIMER'S DEMENTIA WITHOUT BEHAVIORAL DISTURBANCE, UNSPECIFIED TIMING OF DEMENTIA ONSET: ICD-10-CM

## 2021-05-20 DIAGNOSIS — G30.9 ALZHEIMER'S DEMENTIA WITHOUT BEHAVIORAL DISTURBANCE, UNSPECIFIED TIMING OF DEMENTIA ONSET: ICD-10-CM

## 2021-05-20 NOTE — TELEPHONE ENCOUNTER
Last ov -  01/26/2021 - Dr. Lu Memory  Next ov - None  Had an appt. On 05/04/20021 and CXL. Donepezil last filled  02/26/2021 - 3 rf.

## 2021-05-25 RX ORDER — DONEPEZIL HYDROCHLORIDE 10 MG/1
TABLET, FILM COATED ORAL
Qty: 60 TABLET | Refills: 1 | Status: SHIPPED | OUTPATIENT
Start: 2021-05-25 | End: 2021-06-18

## 2021-06-18 DIAGNOSIS — G30.9 ALZHEIMER'S DEMENTIA WITHOUT BEHAVIORAL DISTURBANCE, UNSPECIFIED TIMING OF DEMENTIA ONSET: ICD-10-CM

## 2021-06-18 DIAGNOSIS — F02.80 ALZHEIMER'S DEMENTIA WITHOUT BEHAVIORAL DISTURBANCE, UNSPECIFIED TIMING OF DEMENTIA ONSET: ICD-10-CM

## 2021-06-18 RX ORDER — DONEPEZIL HYDROCHLORIDE 10 MG/1
TABLET, FILM COATED ORAL
Qty: 60 TABLET | Refills: 1 | Status: SHIPPED | OUTPATIENT
Start: 2021-06-18 | End: 2021-06-30

## 2021-06-18 NOTE — TELEPHONE ENCOUNTER
Last ov - 01/26/2021 -  Dr. Angie Young  Next ov -   None    Donepezil last filled 05/25/2021 - 1 rf

## 2021-06-30 ENCOUNTER — OFFICE VISIT (OUTPATIENT)
Dept: INTERNAL MEDICINE CLINIC | Age: 86
End: 2021-06-30
Payer: MEDICARE

## 2021-06-30 VITALS
DIASTOLIC BLOOD PRESSURE: 76 MMHG | TEMPERATURE: 96.6 F | SYSTOLIC BLOOD PRESSURE: 138 MMHG | HEART RATE: 72 BPM | HEIGHT: 64 IN | BODY MASS INDEX: 16.49 KG/M2 | OXYGEN SATURATION: 94 % | WEIGHT: 96.6 LBS

## 2021-06-30 DIAGNOSIS — J45.20 MILD INTERMITTENT ASTHMA WITHOUT COMPLICATION: ICD-10-CM

## 2021-06-30 DIAGNOSIS — H52.13 MYOPIA OF BOTH EYES: ICD-10-CM

## 2021-06-30 DIAGNOSIS — G30.9 ALZHEIMER'S DEMENTIA WITHOUT BEHAVIORAL DISTURBANCE, UNSPECIFIED TIMING OF DEMENTIA ONSET: Primary | ICD-10-CM

## 2021-06-30 DIAGNOSIS — F02.80 ALZHEIMER'S DEMENTIA WITHOUT BEHAVIORAL DISTURBANCE, UNSPECIFIED TIMING OF DEMENTIA ONSET: Primary | ICD-10-CM

## 2021-06-30 PROCEDURE — 99213 OFFICE O/P EST LOW 20 MIN: CPT | Performed by: STUDENT IN AN ORGANIZED HEALTH CARE EDUCATION/TRAINING PROGRAM

## 2021-06-30 RX ORDER — DONEPEZIL HYDROCHLORIDE 10 MG/1
TABLET, FILM COATED ORAL
Qty: 60 TABLET | Refills: 1 | Status: SHIPPED | OUTPATIENT
Start: 2021-06-30 | End: 2021-07-19

## 2021-06-30 RX ORDER — MEMANTINE HYDROCHLORIDE 5 MG/1
5 TABLET ORAL 2 TIMES DAILY
Qty: 60 TABLET | Refills: 1 | Status: SHIPPED | OUTPATIENT
Start: 2021-06-30 | End: 2021-07-23

## 2021-06-30 NOTE — PATIENT INSTRUCTIONS
-start taking memantine 5 mg at night for 2 days, then 5 mg two times a day. Then followup again in 1 month.

## 2021-06-30 NOTE — PROGRESS NOTES
Outpatient Clinic Established Patient Note    Patient: Blayne Guzman  : 1935 (89 y.o.)  Date: 2021    CC: Dementia. HPI:      History as per son who is in the room with her. Pt denies any complaints or concerns. Problems with getting dressed and undressed. Not eating as much. Forgetting things, forgetting use of bathroom. 2 weeks: weight loss of 11 lb since 2021 visit. . Only soft food because of dentures. Hasn't tried ensure or other supplements. No nausea, or vomiting. No cough, fevers, chills, night sweats. Denies ABd pain, melana, hematochezia. No falls. But is unsteady. Needs eye doctor referral as son reports she is having problems with seeing objects close to her such as her pills, food. Home Meds:  Prior to Visit Medications    Medication Sig Taking?  Authorizing Provider   budesonide (PULMICORT FLEXHALER) 180 MCG/ACT AEPB inhaler Inhale 2 puffs into the lungs 2 times daily Yes Landry Gama MD   memantine (NAMENDA) 5 MG tablet Take 1 tablet by mouth 2 times daily Yes Landry Gama MD   donepezil (ARICEPT) 10 MG tablet Mary Vizcarra MD   amLODIPine (NORVASC) 5 MG tablet TAKE 1 TABLET BY MOUTH EVERY DAY Yes Clyde Murphy MD   calcium-cholecalciferol (OYSCO 500 + D) 500-200 MG-UNIT per tablet TAKE 1 TABLET BY MOUTH TWICE A DAY Yes Clyde Murphy MD   alendronate (FOSAMAX) 70 MG tablet TAKE ONE TABLET BY MOUTH PER WEEK Yes Lolis Lafleur MD   ACETAMINOPHEN PO Take by mouth Yes Historical Provider, MD   albuterol sulfate HFA (VENTOLIN HFA) 108 (90 Base) MCG/ACT inhaler Inhale 2 puffs into the lungs every 6 hours as needed for Wheezing Yes Juan Islas MD       Allergies:    Aspirin, Codeine, Pcn [penicillins], Prednisone, and Zyban [bupropion hcl]    Health Maintenance Due   Topic Date Due    Shingles Vaccine (1 of 2) Never done    Annual Wellness Visit (AWV)  Never done    Potassium monitoring  2021 immediately if she starts having these aforementioned effects. We will also check vitamin B12 folate thiamine, and TSH given progressive dementia. - memantine (NAMENDA) 5 MG tablet; Take 1 tablet by mouth 2 times daily  Dispense: 60 tablet; Refill: 1  - donepezil (ARICEPT) 10 MG tablet; TAKE 1TABLETS BY MOUTH EVERY NIGHT  Dispense: 60 tablet; Refill: 1  - VITAMIN B12 & FOLATE; Future  - VITAMIN B1; Future    2. Mild intermittent asthma without complication  - budesonide (PULMICORT FLEXHALER) 180 MCG/ACT AEPB inhaler; Inhale 2 puffs into the lungs 2 times daily  Dispense: 1 each; Refill: 2    3. Myopia of both eyes  - AFL - H[place]enbrand, Meme, OD, Optometry, [place]  No CAD in 1 month  Return in about 4 weeks (around 7/28/2021). Dispo: Pt has been staffed with Dr. Kofi Swanson  _______________  Carolin Stein MD, 6/30/2021 3:06 PM   PGY-2    Addendum to Resident H& P/Progress note:  I have personally seen,examined and evaluated the patient.  I have reviewed the current history, physical findings, labs and assessment and plan and agree with note as documented by resident MD ( Mayaguez Covert)      Atif Novoa MD, Klaus Sommers

## 2021-07-13 DIAGNOSIS — I10 ESSENTIAL HYPERTENSION: ICD-10-CM

## 2021-07-13 DIAGNOSIS — F02.80 ALZHEIMER'S DEMENTIA WITHOUT BEHAVIORAL DISTURBANCE, UNSPECIFIED TIMING OF DEMENTIA ONSET: ICD-10-CM

## 2021-07-13 DIAGNOSIS — G30.9 ALZHEIMER'S DEMENTIA WITHOUT BEHAVIORAL DISTURBANCE, UNSPECIFIED TIMING OF DEMENTIA ONSET: ICD-10-CM

## 2021-07-13 LAB
BASOPHILS ABSOLUTE: 0 K/UL (ref 0–0.2)
BASOPHILS RELATIVE PERCENT: 1.4 %
EOSINOPHILS ABSOLUTE: 0.1 K/UL (ref 0–0.6)
EOSINOPHILS RELATIVE PERCENT: 4.2 %
FOLATE: 9.28 NG/ML (ref 4.78–24.2)
HCT VFR BLD CALC: 42.5 % (ref 36–48)
HEMOGLOBIN: 14 G/DL (ref 12–16)
LYMPHOCYTES ABSOLUTE: 1.2 K/UL (ref 1–5.1)
LYMPHOCYTES RELATIVE PERCENT: 38.2 %
MCH RBC QN AUTO: 30.6 PG (ref 26–34)
MCHC RBC AUTO-ENTMCNC: 33 G/DL (ref 31–36)
MCV RBC AUTO: 92.8 FL (ref 80–100)
MONOCYTES ABSOLUTE: 0.2 K/UL (ref 0–1.3)
MONOCYTES RELATIVE PERCENT: 5.6 %
NEUTROPHILS ABSOLUTE: 1.6 K/UL (ref 1.7–7.7)
NEUTROPHILS RELATIVE PERCENT: 50.6 %
PDW BLD-RTO: 12.7 % (ref 12.4–15.4)
PLATELET # BLD: 219 K/UL (ref 135–450)
PMV BLD AUTO: 8.8 FL (ref 5–10.5)
RBC # BLD: 4.58 M/UL (ref 4–5.2)
VITAMIN B-12: 686 PG/ML (ref 211–911)
WBC # BLD: 3.1 K/UL (ref 4–11)

## 2021-07-14 LAB
A/G RATIO: 1.6 (ref 1.1–2.2)
ALBUMIN SERPL-MCNC: 4.4 G/DL (ref 3.4–5)
ALP BLD-CCNC: 71 U/L (ref 40–129)
ALT SERPL-CCNC: 12 U/L (ref 10–40)
ANION GAP SERPL CALCULATED.3IONS-SCNC: 6 MMOL/L (ref 3–16)
AST SERPL-CCNC: 15 U/L (ref 15–37)
BILIRUB SERPL-MCNC: 0.6 MG/DL (ref 0–1)
BUN BLDV-MCNC: 13 MG/DL (ref 7–20)
CALCIUM SERPL-MCNC: 10 MG/DL (ref 8.3–10.6)
CHLORIDE BLD-SCNC: 106 MMOL/L (ref 99–110)
CHOLESTEROL, TOTAL: 192 MG/DL (ref 0–199)
CO2: 31 MMOL/L (ref 21–32)
CREAT SERPL-MCNC: 0.7 MG/DL (ref 0.6–1.2)
GFR AFRICAN AMERICAN: >60
GFR NON-AFRICAN AMERICAN: >60
GLOBULIN: 2.8 G/DL
GLUCOSE FASTING: 83 MG/DL (ref 70–99)
HDLC SERPL-MCNC: 77 MG/DL (ref 40–60)
LDL CHOLESTEROL CALCULATED: 95 MG/DL
POTASSIUM SERPL-SCNC: 4.5 MMOL/L (ref 3.5–5.1)
SODIUM BLD-SCNC: 143 MMOL/L (ref 136–145)
TOTAL PROTEIN: 7.2 G/DL (ref 6.4–8.2)
TRIGL SERPL-MCNC: 98 MG/DL (ref 0–150)
TSH REFLEX: 1.66 UIU/ML (ref 0.27–4.2)
VLDLC SERPL CALC-MCNC: 20 MG/DL

## 2021-07-16 LAB — VITAMIN B1, PLASMA: 9 NMOL/L (ref 4–15)

## 2021-07-19 ENCOUNTER — TELEPHONE (OUTPATIENT)
Dept: INTERNAL MEDICINE CLINIC | Age: 86
End: 2021-07-19

## 2021-07-19 DIAGNOSIS — F02.80 ALZHEIMER'S DEMENTIA WITHOUT BEHAVIORAL DISTURBANCE, UNSPECIFIED TIMING OF DEMENTIA ONSET: ICD-10-CM

## 2021-07-19 DIAGNOSIS — R41.0 CONFUSION: Primary | ICD-10-CM

## 2021-07-19 DIAGNOSIS — G30.9 ALZHEIMER'S DEMENTIA WITHOUT BEHAVIORAL DISTURBANCE, UNSPECIFIED TIMING OF DEMENTIA ONSET: ICD-10-CM

## 2021-07-20 RX ORDER — DONEPEZIL HYDROCHLORIDE 10 MG/1
TABLET, FILM COATED ORAL
Qty: 60 TABLET | Refills: 1 | Status: SHIPPED | OUTPATIENT
Start: 2021-07-20 | End: 2021-09-16

## 2021-07-23 DIAGNOSIS — F02.80 ALZHEIMER'S DEMENTIA WITHOUT BEHAVIORAL DISTURBANCE, UNSPECIFIED TIMING OF DEMENTIA ONSET: ICD-10-CM

## 2021-07-23 DIAGNOSIS — G30.9 ALZHEIMER'S DEMENTIA WITHOUT BEHAVIORAL DISTURBANCE, UNSPECIFIED TIMING OF DEMENTIA ONSET: ICD-10-CM

## 2021-07-23 RX ORDER — MEMANTINE HYDROCHLORIDE 5 MG/1
5 TABLET ORAL 2 TIMES DAILY
Qty: 60 TABLET | Refills: 1 | Status: SHIPPED | OUTPATIENT
Start: 2021-07-23 | End: 2021-08-11

## 2021-07-23 NOTE — TELEPHONE ENCOUNTER
Last ov - 06/30/2021 - Dr. Mahendra Muir  Next ov - 08/11/2021 - Len Flores      Memgavin last filled  06/30/2021 - 1 rf

## 2021-08-11 ENCOUNTER — OFFICE VISIT (OUTPATIENT)
Dept: INTERNAL MEDICINE CLINIC | Age: 86
End: 2021-08-11
Payer: MEDICARE

## 2021-08-11 VITALS
HEIGHT: 64 IN | DIASTOLIC BLOOD PRESSURE: 65 MMHG | WEIGHT: 96.5 LBS | BODY MASS INDEX: 16.47 KG/M2 | HEART RATE: 86 BPM | TEMPERATURE: 97.9 F | OXYGEN SATURATION: 92 % | SYSTOLIC BLOOD PRESSURE: 122 MMHG

## 2021-08-11 DIAGNOSIS — J45.20 MILD INTERMITTENT ASTHMA WITHOUT COMPLICATION: ICD-10-CM

## 2021-08-11 PROCEDURE — 99213 OFFICE O/P EST LOW 20 MIN: CPT | Performed by: STUDENT IN AN ORGANIZED HEALTH CARE EDUCATION/TRAINING PROGRAM

## 2021-08-11 NOTE — PROGRESS NOTES
Outpatient Clinic Established Patient Note    Patient: Jassi Seen  : 1935 (81 y.o.)  Date: 2021    CC:     HPI:      81 F PMH alzheimer's dementia, HTN, osteoporosis and asthma who presents for follow up.     Alzheimer's dementia - was started on namenda last visit which the patient did not tolerate - she reportedly had increased unsteadiness, confusion and could not recognize her own family members. Son discontinued medication and all her symptoms resolved, hence did not get a UA. She is currently doing well, son reports she has good sleep, appetite, energy levels, and is not withdrawn. He states she is able to feed, bathe and dress herself with some guidance and overall sx appear to be stable.     HTN - BP well controlled, compliant with meds.     Asthma - well controlled, uses pulmicort everyday, uses albuterol hardly once a month.     Osteoporosis - no recent falls, adherent with fosfamax.      Received both doses of covid-19 vaccine as of 2021. Home Meds:  Prior to Visit Medications    Medication Sig Taking?  Authorizing Provider   budesonide (PULMICORT FLEXHALER) 180 MCG/ACT AEPB inhaler Inhale 2 puffs into the lungs 2 times daily Yes Andrew Garcia MD   donepezil (ARICEPT) 10 MG tablet TAKE 2 TABLETS BY MOUTH EVERY NIGHT  Patient taking differently: Take 10 mg by mouth Daily TAKE 1 TABLETS BY MOUTH Nightly Yes Dedra Pizano MD   amLODIPine (NORVASC) 5 MG tablet TAKE 1 TABLET BY MOUTH EVERY DAY Yes Vianney Snow MD   calcium-cholecalciferol (OYSCO 500 + D) 500-200 MG-UNIT per tablet TAKE 1 TABLET BY MOUTH TWICE A DAY Yes Vianney Snow MD   alendronate (FOSAMAX) 70 MG tablet TAKE ONE TABLET BY MOUTH PER WEEK Yes Andrew Garcia MD   ACETAMINOPHEN PO Take by mouth Yes Historical Provider, MD   albuterol sulfate HFA (VENTOLIN HFA) 108 (90 Base) MCG/ACT inhaler Inhale 2 puffs into the lungs every 6 hours as needed for Wheezing Yes Elise Saucedo MD       Allergies: Memantine, Aspirin, Codeine, Pcn [penicillins], Prednisone, and Zyban [bupropion hcl]    Health Maintenance Due   Topic Date Due    Shingles Vaccine (1 of 2) Never done   [employer] Visit (AWV)  Never done       Immunization History   Administered Date(s) Administered    COVID-19, Moderna, PF, 100mcg/0.5mL 01/24/2021, 02/21/2021    Influenza, Quadv, IM, PF (6 mo and older Fluzone, Flulaval, Fluarix, and 3 yrs and older Afluria) 12/04/2019    Pneumococcal Conjugate 13-valent ([id number]) 08/26/2019    Pneumococcal Polysaccharide ([id number]) 01/26/2021    Tdap (Boostrix, Adacel) 08/26/2019       Review of Systems  A 10-organ Review Of Systems was obtained and otherwise unremarkable except as per HPI. Data: Old records have been reviewed electronically. PHYSICAL EXAM:  /65 (Site: Left Upper Arm, Position: Sitting, Cuff Size: Small Adult)   Pulse 86   Temp 97.9 °F (36.6 °C) (Temporal)   Ht 5' 4\" (1.626 m)   Wt 96 lb 8 oz (43.8 kg)   SpO2 92%   BMI 16.56 kg/m²   Physical Exam   Physical Exam  Vitals signs reviewed. Constitutional:       General: She is not in acute distress. Appearance: Elderly, thin-appearing female. Cardiovascular:      Rate and Rhythm: Normal rate and regular rhythm. Heart sounds: No murmur. No friction rub. No gallop. Pulmonary:      Effort: Pulmonary effort is normal. No respiratory distress. Breath sounds: Normal breath sounds. No wheezing, rhonchi or rales. Abdominal:      General: Bowel sounds are normal. There is no distension. Palpations: Abdomen is soft. Tenderness: There is no abdominal tenderness. There is no guarding or rebound. Musculoskeletal:      Right lower leg: No edema. Left lower leg: No edema.    Neurological:      Mental Status: She is alert.         Assessment & Plan:      Alzheimer's dementia without behavioral disturbance  Sx stable  - Continue donepezil 10 mg nightly  - Continue off namenda      Essential hypertension  Stable  - continue amlodipine 5 mg daily     Mild intermittent sthma  Stable  -continue budesonide  -continue albuterol PRN        Health maintenance:  PPSV23, influenza, covid-19 -- up to date  Return in about 5 months (around 1/11/2022).     Dispo: Pt has been staffed with Dr. Adelso Strange  _______________  Kimberly Kevin MD, 8/11/2021 2:52 PM   PGY-3

## 2021-08-20 RX ORDER — BUDESONIDE AND FORMOTEROL FUMARATE DIHYDRATE 160; 4.5 UG/1; UG/1
2 AEROSOL RESPIRATORY (INHALATION) 2 TIMES DAILY
Qty: 3 INHALER | Refills: 1 | Status: SHIPPED | OUTPATIENT
Start: 2021-08-20 | End: 2022-06-09

## 2021-10-04 DIAGNOSIS — I10 ESSENTIAL HYPERTENSION: ICD-10-CM

## 2021-10-04 RX ORDER — AMLODIPINE BESYLATE 5 MG/1
TABLET ORAL
Qty: 30 TABLET | Refills: 5 | Status: SHIPPED | OUTPATIENT
Start: 2021-10-04 | End: 2022-04-01

## 2021-10-10 DIAGNOSIS — G30.9 ALZHEIMER'S DEMENTIA WITHOUT BEHAVIORAL DISTURBANCE, UNSPECIFIED TIMING OF DEMENTIA ONSET: ICD-10-CM

## 2021-10-10 DIAGNOSIS — F02.80 ALZHEIMER'S DEMENTIA WITHOUT BEHAVIORAL DISTURBANCE, UNSPECIFIED TIMING OF DEMENTIA ONSET: ICD-10-CM

## 2021-10-12 RX ORDER — DONEPEZIL HYDROCHLORIDE 10 MG/1
TABLET, FILM COATED ORAL
Qty: 60 TABLET | Refills: 3 | Status: SHIPPED | OUTPATIENT
Start: 2021-10-12 | End: 2022-06-09

## 2022-01-31 DIAGNOSIS — M81.0 AGE-RELATED OSTEOPOROSIS WITHOUT CURRENT PATHOLOGICAL FRACTURE: ICD-10-CM

## 2022-01-31 RX ORDER — ALENDRONATE SODIUM 70 MG/1
TABLET ORAL
Qty: 12 TABLET | Refills: 3 | Status: SHIPPED | OUTPATIENT
Start: 2022-01-31

## 2022-04-01 DIAGNOSIS — I10 ESSENTIAL HYPERTENSION: ICD-10-CM

## 2022-04-01 RX ORDER — AMLODIPINE BESYLATE 5 MG/1
TABLET ORAL
Qty: 30 TABLET | Refills: 0 | Status: SHIPPED | OUTPATIENT
Start: 2022-04-01 | End: 2022-04-25

## 2022-04-25 DIAGNOSIS — I10 ESSENTIAL HYPERTENSION: ICD-10-CM

## 2022-04-25 RX ORDER — AMLODIPINE BESYLATE 5 MG/1
TABLET ORAL
Qty: 30 TABLET | Refills: 0 | Status: SHIPPED | OUTPATIENT
Start: 2022-04-25 | End: 2022-05-25

## 2022-04-25 NOTE — TELEPHONE ENCOUNTER
Refill amlodipine  Last seen 8-12-21 Miguelangel  Missed 1-13-22 appointment     Called and spoke w/ pt's son, Keya Jewell, requested for pt to call and schedule appt

## 2022-05-21 DIAGNOSIS — I10 ESSENTIAL HYPERTENSION: ICD-10-CM

## 2022-05-23 NOTE — TELEPHONE ENCOUNTER
Refill amlodipine  Last seen 8-12-21 [facility] [place]  Missed 1-13-22 appointment

## 2022-05-25 RX ORDER — AMLODIPINE BESYLATE 5 MG/1
TABLET ORAL
Qty: 30 TABLET | Refills: 0 | Status: SHIPPED | OUTPATIENT
Start: 2022-05-25 | End: 2022-06-20

## 2022-06-02 DIAGNOSIS — M81.0 OSTEOPOROSIS WITHOUT CURRENT PATHOLOGICAL FRACTURE, UNSPECIFIED OSTEOPOROSIS TYPE: ICD-10-CM

## 2022-06-02 RX ORDER — CALCIUM CARBONATE/VITAMIN D3 500MG-5MCG
TABLET ORAL
Qty: 180 TABLET | Refills: 0 | Status: SHIPPED | OUTPATIENT
Start: 2022-06-02

## 2022-06-02 NOTE — TELEPHONE ENCOUNTER
Refill Oysco-5oo vit d3  Last OV  8-11-21   Next appt.  not scheduled    Left message for patient to call for appointment

## 2022-06-09 ENCOUNTER — OFFICE VISIT (OUTPATIENT)
Dept: INTERNAL MEDICINE CLINIC | Age: 87
End: 2022-06-09
Payer: MEDICARE

## 2022-06-09 VITALS
BODY MASS INDEX: 17.79 KG/M2 | SYSTOLIC BLOOD PRESSURE: 129 MMHG | WEIGHT: 104.2 LBS | OXYGEN SATURATION: 97 % | HEIGHT: 64 IN | RESPIRATION RATE: 20 BRPM | HEART RATE: 68 BPM | DIASTOLIC BLOOD PRESSURE: 68 MMHG | TEMPERATURE: 96.6 F

## 2022-06-09 DIAGNOSIS — G30.9 ALZHEIMER'S DEMENTIA WITHOUT BEHAVIORAL DISTURBANCE, UNSPECIFIED TIMING OF DEMENTIA ONSET: ICD-10-CM

## 2022-06-09 DIAGNOSIS — F02.80 ALZHEIMER'S DEMENTIA WITHOUT BEHAVIORAL DISTURBANCE, UNSPECIFIED TIMING OF DEMENTIA ONSET: ICD-10-CM

## 2022-06-09 PROCEDURE — 99213 OFFICE O/P EST LOW 20 MIN: CPT

## 2022-06-09 RX ORDER — DONEPEZIL HYDROCHLORIDE 10 MG/1
10 TABLET, FILM COATED ORAL NIGHTLY
Qty: 60 TABLET | Refills: 3 | Status: SHIPPED | OUTPATIENT
Start: 2022-06-09 | End: 2022-08-08

## 2022-06-09 ASSESSMENT — ENCOUNTER SYMPTOMS
CHEST TIGHTNESS: 0
APNEA: 0
CONSTIPATION: 0
NAUSEA: 0
VOMITING: 0
SORE THROAT: 0
ABDOMINAL PAIN: 0
SHORTNESS OF BREATH: 0
COUGH: 0
DIARRHEA: 0

## 2022-06-09 NOTE — PROGRESS NOTES
6/9/2022    Ban Mai  YOB: 1935    Chief Complaint: Follow-up    History of Present Illness:    Ms. Ban Mai is a 81year-old female with a PMHx of hypertension, osteoporosis, asthma, and Alzheimer's type dementia who presents today with her son for a regular follow-up visit. Per the patient's son, the patient's memory dementia has been slowly getting progressively worse over the past 3 years that she has lived with him. Physically she is doing well and has no issues, but is starting to become more forgetful. Overall, she is doing well living with family and the family is doing well taking care of her. The patient and her son have no acute complaints at this time. Review of Systems:  Review of Systems   Constitutional: Negative for activity change, appetite change, chills, diaphoresis, fever and unexpected weight change. HENT: Negative for congestion and sore throat. Eyes: Negative for visual disturbance. Respiratory: Negative for apnea, cough, chest tightness and shortness of breath. Cardiovascular: Negative for chest pain, palpitations and leg swelling. Gastrointestinal: Negative for abdominal pain, constipation, diarrhea, nausea and vomiting. Endocrine: Negative for cold intolerance, heat intolerance, polydipsia, polyphagia and polyuria. Genitourinary: Negative for difficulty urinating. Musculoskeletal: Negative for arthralgias and myalgias. Neurological: Negative for weakness and headaches. Psychiatric/Behavioral: Positive for confusion (at baseline. ). Negative for sleep disturbance. All other systems reviewed and are negative.        Past Medical History:   Diagnosis Date    Asthma     Constipation     Hypertension     Osteoporosis     Sinus trouble     hx of    Vitamin D deficiency      Past Surgical History:   Procedure Laterality Date    HYSTERECTOMY (CERVIX STATUS UNKNOWN)  1984    THYROIDECTOMY  1965     Social History     Tobacco Use    Smoking status: Former Smoker     Quit date: 1968     Years since quittin.7    Smokeless tobacco: Never Used   Substance Use Topics    Alcohol use: No    Drug use: No     No family history on file. Prior to Visit Medications    Medication Sig Taking? Authorizing Provider   donepezil (ARICEPT) 10 MG tablet Take 1 tablet by mouth nightly Yes Natali Rose MD   calcium-cholecalciferol (OYSCO 500 + D) 500-200 MG-UNIT per tablet TAKE 1 TABLET BY MOUTH TWICE A DAY Yes Orly Restrepo MD   amLODIPine (NORVASC) 5 MG tablet TAKE 1 TABLET BY MOUTH EVERY DAY Yes Kris Campos MD   alendronate (FOSAMAX) 70 MG tablet TAKE ONE TABLET BY MOUTH PER WEEK Yes Marielena Miller MD   budesonide (PULMICORT FLEXHALER) 180 MCG/ACT AEPB inhaler Inhale 2 puffs into the lungs 2 times daily Yes Kris Campos MD   ACETAMINOPHEN PO Take by mouth Yes Historical Provider, MD   albuterol sulfate HFA (VENTOLIN HFA) 108 (90 Base) MCG/ACT inhaler Inhale 2 puffs into the lungs every 6 hours as needed for Wheezing  Patient not taking: Reported on 2022  Andrey Ferrera MD     Allergies   Allergen Reactions    Memantine Other (See Comments)     Made her very confused - off Balance    Aspirin      \"Can't take\"    Codeine Itching    Pcn [Penicillins] Itching    Prednisone      Went \"out of my head\"; severe psychosis    Zyban [Bupropion Hcl]      Pt denies this; severe psychosis, per chart       Physical Exam:  Vitals:    22 1330   BP: 129/68   Site: Left Upper Arm   Position: Sitting   Cuff Size: Medium Adult   Pulse: 68   Resp: 20   Temp: (!) 96.6 °F (35.9 °C)   TempSrc: Temporal   SpO2: 97%   Weight: 104 lb 3.2 oz (47.3 kg)   Height: 5' 4\" (1.626 m)     Estimated body mass index is 17.89 kg/m² as calculated from the following:    Height as of this encounter: 5' 4\" (1.626 m). Weight as of this encounter: 104 lb 3.2 oz (47.3 kg). Physical Exam  Vitals reviewed.    Constitutional:       General: She is not in acute distress. Appearance: She is well-developed and well-groomed. HENT:      Head: Normocephalic and atraumatic. Mouth/Throat:      Mouth: Mucous membranes are moist.      Pharynx: Oropharynx is clear. Eyes:      General: No scleral icterus. Extraocular Movements: Extraocular movements intact. Conjunctiva/sclera: Conjunctivae normal.      Pupils: Pupils are equal, round, and reactive to light. Cardiovascular:      Rate and Rhythm: Normal rate and regular rhythm. Pulses: Normal pulses. Heart sounds: Normal heart sounds, S1 normal and S2 normal. No murmur heard. Pulmonary:      Effort: Pulmonary effort is normal. No respiratory distress. Breath sounds: Normal breath sounds and air entry. Abdominal:      General: Abdomen is flat. Bowel sounds are normal.      Palpations: Abdomen is soft. Tenderness: There is no abdominal tenderness. Musculoskeletal:         General: Normal range of motion. Cervical back: Full passive range of motion without pain, normal range of motion and neck supple. Skin:     General: Skin is warm and dry. Neurological:      General: No focal deficit present. Mental Status: She is alert and oriented to person, place, and time. Cranial Nerves: Cranial nerves are intact. Sensory: Sensation is intact. Motor: Motor function is intact. Coordination: Coordination is intact. Gait: Gait is intact. Deep Tendon Reflexes: Reflexes are normal and symmetric. Psychiatric:         Attention and Perception: Attention and perception normal.         Mood and Affect: Mood normal.         Speech: Speech normal.         Behavior: Behavior normal. Behavior is cooperative. Thought Content: Thought content normal.         Cognition and Memory: Cognition and memory normal.         Judgment: Judgment normal.         Assessment and Plan:    1.  Alzheimer's dementia without behavioral disturbance, unspecified timing of dementia onset ([facility] [address]  No acute issues at this time. Dementia is slowly progressing, but the patient and her family are handling everything well at the presnet time. - Continue donepezil 10 mg nightly      Essential hypertension  Stable.  BP in the office today is 129/68  - continue amlodipine 5 mg daily     Mild intermittent sthma  Stable  -continue budesonide  -continue albuterol PRN     Osteoporosis  No broken bones  - Fosamax 70 mg weekly    Health maintenance:  PPSV23, influenza, covid-19 -- up to date    Return in about 6 months (around 12/9/2022).    -----------------------------  Claus Grimaldo MD, PGY-3  6/9/2022  1:52 PM

## 2022-06-18 DIAGNOSIS — I10 ESSENTIAL HYPERTENSION: ICD-10-CM

## 2022-06-20 RX ORDER — AMLODIPINE BESYLATE 5 MG/1
TABLET ORAL
Qty: 30 TABLET | Refills: 0 | Status: SHIPPED | OUTPATIENT
Start: 2022-06-20 | End: 2022-07-25

## 2022-07-16 DIAGNOSIS — I10 ESSENTIAL HYPERTENSION: ICD-10-CM

## 2022-07-25 RX ORDER — AMLODIPINE BESYLATE 5 MG/1
TABLET ORAL
Qty: 30 TABLET | Refills: 0 | Status: SHIPPED | OUTPATIENT
Start: 2022-07-25 | End: 2022-08-23

## 2022-08-06 DIAGNOSIS — G30.9 ALZHEIMER'S DEMENTIA WITHOUT BEHAVIORAL DISTURBANCE, UNSPECIFIED TIMING OF DEMENTIA ONSET: ICD-10-CM

## 2022-08-06 DIAGNOSIS — F02.80 ALZHEIMER'S DEMENTIA WITHOUT BEHAVIORAL DISTURBANCE, UNSPECIFIED TIMING OF DEMENTIA ONSET: ICD-10-CM

## 2022-08-08 RX ORDER — DONEPEZIL HYDROCHLORIDE 10 MG/1
10 TABLET, FILM COATED ORAL NIGHTLY
Qty: 60 TABLET | Refills: 5 | Status: SHIPPED | OUTPATIENT
Start: 2022-08-08

## 2022-08-23 DIAGNOSIS — I10 ESSENTIAL HYPERTENSION: ICD-10-CM

## 2022-08-23 RX ORDER — AMLODIPINE BESYLATE 5 MG/1
TABLET ORAL
Qty: 30 TABLET | Refills: 3 | Status: SHIPPED | OUTPATIENT
Start: 2022-08-23

## 2022-11-24 DIAGNOSIS — I10 ESSENTIAL HYPERTENSION: ICD-10-CM

## 2022-11-28 RX ORDER — AMLODIPINE BESYLATE 5 MG/1
TABLET ORAL
Qty: 90 TABLET | Refills: 1 | Status: SHIPPED | OUTPATIENT
Start: 2022-11-28

## 2022-11-28 NOTE — TELEPHONE ENCOUNTER
Requested Prescriptions     Pending Prescriptions Disp Refills    amLODIPine (NORVASC) 5 MG tablet [Pharmacy Med Name: AMLODIPINE BESYLATE 5 MG TAB] 90 tablet 1     Sig: TAKE 1 TABLET BY MOUTH EVERY DAY       Last Clinic Visit:  6/9/2022     Next Clinic Appointment:  12/8/2022

## 2022-12-06 DIAGNOSIS — M81.0 AGE-RELATED OSTEOPOROSIS WITHOUT CURRENT PATHOLOGICAL FRACTURE: ICD-10-CM

## 2022-12-06 RX ORDER — ALENDRONATE SODIUM 70 MG/1
TABLET ORAL
Qty: 12 TABLET | Refills: 3 | Status: SHIPPED | OUTPATIENT
Start: 2022-12-06

## 2022-12-06 NOTE — TELEPHONE ENCOUNTER
Requested Prescriptions     Pending Prescriptions Disp Refills    alendronate (FOSAMAX) 70 MG tablet [Pharmacy Med Name: ALENDRONATE SODIUM 70 MG TAB] 12 tablet 3     Sig: TAKE ONE TABLET BY MOUTH PER WEEK       Last Clinic Visit:  6/9/2022     Next Clinic Appointment:  12/8/2022

## 2023-01-13 ENCOUNTER — OFFICE VISIT (OUTPATIENT)
Dept: INTERNAL MEDICINE CLINIC | Age: 88
End: 2023-01-13
Payer: MEDICARE

## 2023-01-13 VITALS
HEART RATE: 87 BPM | SYSTOLIC BLOOD PRESSURE: 129 MMHG | DIASTOLIC BLOOD PRESSURE: 85 MMHG | BODY MASS INDEX: 16.88 KG/M2 | WEIGHT: 101.3 LBS | TEMPERATURE: 97.8 F | OXYGEN SATURATION: 94 % | HEIGHT: 65 IN | RESPIRATION RATE: 24 BRPM

## 2023-01-13 DIAGNOSIS — S22.41XS: ICD-10-CM

## 2023-01-13 DIAGNOSIS — Z23 NEEDS FLU SHOT: Primary | ICD-10-CM

## 2023-01-13 DIAGNOSIS — R07.81 RIB PAIN ON RIGHT SIDE: ICD-10-CM

## 2023-01-13 PROCEDURE — 6360000002 HC RX W HCPCS: Performed by: STUDENT IN AN ORGANIZED HEALTH CARE EDUCATION/TRAINING PROGRAM

## 2023-01-13 PROCEDURE — 90694 VACC AIIV4 NO PRSRV 0.5ML IM: CPT | Performed by: STUDENT IN AN ORGANIZED HEALTH CARE EDUCATION/TRAINING PROGRAM

## 2023-01-13 PROCEDURE — G0008 ADMIN INFLUENZA VIRUS VAC: HCPCS | Performed by: STUDENT IN AN ORGANIZED HEALTH CARE EDUCATION/TRAINING PROGRAM

## 2023-01-13 PROCEDURE — 99213 OFFICE O/P EST LOW 20 MIN: CPT | Performed by: STUDENT IN AN ORGANIZED HEALTH CARE EDUCATION/TRAINING PROGRAM

## 2023-01-13 RX ORDER — GALANTAMINE HYDROBROMIDE 4 MG/1
4 TABLET, FILM COATED ORAL 2 TIMES DAILY
Qty: 60 TABLET | Refills: 3 | Status: SHIPPED | OUTPATIENT
Start: 2023-01-13

## 2023-01-13 RX ADMIN — INFLUENZA A VIRUS A/VICTORIA/2570/2019 IVR-215 (H1N1) ANTIGEN (FORMALDEHYDE INACTIVATED), INFLUENZA A VIRUS A/DARWIN/6/2021 IVR-227 (H3N2) ANTIGEN (FORMALDEHYDE INACTIVATED), INFLUENZA B VIRUS B/AUSTRIA/1359417/2021 BVR-26 ANTIGEN (FORMALDEHYDE INACTIVATED), INFLUENZA B VIRUS B/PHUKET/3073/2013 BVR-1B ANTIGEN (FORMALDEHYDE INACTIVATED) 0.5 ML: 15; 15; 15; 15 INJECTION, SUSPENSION INTRAMUSCULAR at 11:29

## 2023-01-13 ASSESSMENT — ENCOUNTER SYMPTOMS
APNEA: 0
ABDOMINAL DISTENTION: 0
COUGH: 0
CHOKING: 0
BLOOD IN STOOL: 0
CHEST TIGHTNESS: 0
ANAL BLEEDING: 0

## 2023-01-13 NOTE — PROGRESS NOTES
[facility]    Nahid Lozano is a 81 y.o. female, here for evaluation of the following concerns:    HPI  61-year-old female with a history of dementia, hypertension presented for follow-up after she had a fall on Wednesday. The patient has advanced dementia and does not remember the fall hence most of the history was extracted from the son. Reports that the patient was walking down the stairs which has 8 steps when she fell to the ground. He did not see the fall as and just found her on the floor after he heard a noise. He feels she tripped however he did not actually visualize it. The patient herselfr does not recall this incident. The patient is complaining of pain in the right lower side of her chest likely over the 10th or 11th rib. There is no association of the pain with breathing or movement. There are no exacerbating or relieving factors. There is just tenderness to palpation. There is no corresponding pain on the left side of the chest.  The son is unsure but thinks that she probably hit her head. The patient herself denies any headache, nausea, vomiting. The patient is on amlodipine 5 mg and has been on this for many many years as per the son. Her blood pressure today is 129/85. I stood the patient up to check for any orthostatic lightheadedness or dizziness which the patient did not report. She denies any chest pain, shortness of breath, abdominal pain, sensory loss, motor weakness, visual changes, jaw pain    Review of Systems   Constitutional:  Negative for activity change, appetite change and chills. HENT:  Negative for dental problem, drooling, ear discharge and ear pain. Respiratory:  Negative for apnea, cough, choking and chest tightness. Cardiovascular:  Negative for chest pain, palpitations and leg swelling. Gastrointestinal:  Negative for abdominal distention, anal bleeding and blood in stool.    Genitourinary:  Negative for decreased urine volume, difficulty urinating, dyspareunia, dysuria, enuresis, flank pain and urgency. Neurological:  Negative for dizziness, facial asymmetry, light-headedness and headaches. Psychiatric/Behavioral:  Negative for agitation, confusion, decreased concentration and dysphoric mood. MEDICATIONS:  Prior to Visit Medications    Medication Sig Taking? Authorizing Provider   alendronate (FOSAMAX) 70 MG tablet TAKE ONE TABLET BY MOUTH PER WEEK Yes Bette Aguirre DO   amLODIPine (NORVASC) 5 MG tablet TAKE 1 TABLET BY MOUTH EVERY DAY Yes Paola Anderson MD   donepezil (ARICEPT) 10 MG tablet TAKE 1 TABLET BY MOUTH NIGHTLY Yes Ruth Rogers MD   calcium-cholecalciferol (OYSCO 500 + D) 500-200 MG-UNIT per tablet TAKE 1 TABLET BY MOUTH TWICE A DAY Yes Jessica Garcia MD   budesonide (PULMICORT FLEXHALER) 180 MCG/ACT AEPB inhaler Inhale 2 puffs into the lungs 2 times daily Yes Adriana Ch MD   ACETAMINOPHEN PO Take by mouth Yes Historical Provider, MD   albuterol sulfate HFA (VENTOLIN HFA) 108 (90 Base) MCG/ACT inhaler Inhale 2 puffs into the lungs every 6 hours as needed for Wheezing  Patient not taking: No sig reported  Miles Campoverde MD        Vitals:    01/13/23 1051   BP: 129/85   Site: Left Upper Arm   Position: Sitting   Cuff Size: Medium Adult   Pulse: 87   Resp: 24   Temp: 97.8 °F (36.6 °C)   TempSrc: Temporal   SpO2: 94%   Weight: 101 lb 4.8 oz (45.9 kg)   Height: 5' 5\" (1.651 m)      Estimated body mass index is 16.86 kg/m² as calculated from the following:    Height as of this encounter: 5' 5\" (1.651 m). Weight as of this encounter: 101 lb 4.8 oz (45.9 kg). Physical Exam  Constitutional:       Appearance: Normal appearance. She is not ill-appearing. HENT:      Head: Normocephalic. Nose: Nose normal.      Mouth/Throat:      Mouth: Mucous membranes are moist.   Eyes:      Extraocular Movements: Extraocular movements intact.       Pupils: Pupils are equal, round, and reactive to light. Cardiovascular:      Rate and Rhythm: Normal rate and regular rhythm. Heart sounds: No murmur heard. No gallop. Pulmonary:      Effort: No respiratory distress. Breath sounds: No wheezing or rales. Abdominal:      General: There is no distension. Tenderness: There is no left CVA tenderness. Musculoskeletal:         General: No swelling. Right lower leg: No edema. Left lower leg: No edema. Comments: Rt lower lateral chest tenderness   Skin:     Capillary Refill: Capillary refill takes less than 2 seconds. Coloration: Skin is not jaundiced. Findings: No lesion or rash. Neurological:      General: No focal deficit present. Mental Status: She is alert. Cranial Nerves: No cranial nerve deficit. Motor: No weakness. Psychiatric:         Mood and Affect: Mood normal.         Behavior: Behavior normal.       ASSESSMENT/PLAN:       Possible rib fracture  -CXR  -Over-the-counter lidocaine patches /  Tylenol for pain control  -reassess in 6 weeks     Alzheimer's dementia  -continue galantamine  -intolerance to memantine      Essential hypertension  Stable. BP in the office today is 129/85  -given age, her BP goal is 130 to 140  -d/c norvasc  -check BP twice a day and record it on paper      Flu shot administered today      The patient was staffed with the teaching attending: Dr. Mellissa Runner. An electronic signature was used to authenticate this note.     --Patricio Shultz MD

## 2023-01-13 NOTE — PATIENT INSTRUCTIONS
Please use over-the-counter lidocaine patches and apply for 12 hours in 1 day  Please use Tylenol for pain control  Please get chest x-ray  Please encourage the patient to take deep breaths at least 10 times in 1 hour to prevent atelectasis which can lead to infection of the lung  Please stop taking norvasc  Please check BP twice a day and record it on paper

## 2023-02-01 RX ORDER — GALANTAMINE HYDROBROMIDE 4 MG/1
4 TABLET, FILM COATED ORAL 2 TIMES DAILY
Qty: 180 TABLET | Refills: 0 | Status: SHIPPED | OUTPATIENT
Start: 2023-02-01

## 2023-02-01 NOTE — TELEPHONE ENCOUNTER
90-day supply request    Requested Prescriptions     Pending Prescriptions Disp Refills    galantamine (RAZADYNE) 4 MG tablet 180 tablet 0     Sig: Take 1 tablet by mouth 2 times daily       Last Clinic Visit:  1/13/2023     Next Clinic Appointment:  2/24/2023

## 2023-02-17 DIAGNOSIS — F02.80 ALZHEIMER'S DEMENTIA WITHOUT BEHAVIORAL DISTURBANCE (HCC): ICD-10-CM

## 2023-02-17 DIAGNOSIS — G30.9 ALZHEIMER'S DEMENTIA WITHOUT BEHAVIORAL DISTURBANCE (HCC): ICD-10-CM

## 2023-02-17 DIAGNOSIS — M81.0 AGE-RELATED OSTEOPOROSIS WITHOUT CURRENT PATHOLOGICAL FRACTURE: ICD-10-CM

## 2023-02-17 RX ORDER — ALENDRONATE SODIUM 70 MG/1
TABLET ORAL
Qty: 12 TABLET | Refills: 5 | Status: SHIPPED | OUTPATIENT
Start: 2023-02-17

## 2023-02-17 RX ORDER — DONEPEZIL HYDROCHLORIDE 10 MG/1
10 TABLET, FILM COATED ORAL NIGHTLY
Qty: 90 TABLET | Refills: 1 | Status: SHIPPED | OUTPATIENT
Start: 2023-02-17

## 2023-02-17 NOTE — TELEPHONE ENCOUNTER
Requested Prescriptions     Pending Prescriptions Disp Refills    alendronate (FOSAMAX) 70 MG tablet 12 tablet 5    donepezil (ARICEPT) 10 MG tablet 90 tablet 1     Sig: Take 1 tablet by mouth nightly       Last Clinic Visit:  1/13/2023     Next Clinic Appointment:  2/24/2023

## 2023-02-28 ENCOUNTER — OFFICE VISIT (OUTPATIENT)
Dept: INTERNAL MEDICINE CLINIC | Age: 88
End: 2023-02-28
Payer: MEDICARE

## 2023-02-28 VITALS
HEART RATE: 69 BPM | WEIGHT: 100.9 LBS | HEIGHT: 65 IN | DIASTOLIC BLOOD PRESSURE: 68 MMHG | OXYGEN SATURATION: 98 % | RESPIRATION RATE: 18 BRPM | BODY MASS INDEX: 16.81 KG/M2 | SYSTOLIC BLOOD PRESSURE: 124 MMHG | TEMPERATURE: 97.2 F

## 2023-02-28 DIAGNOSIS — M81.0 OSTEOPOROSIS WITHOUT CURRENT PATHOLOGICAL FRACTURE, UNSPECIFIED OSTEOPOROSIS TYPE: Chronic | ICD-10-CM

## 2023-02-28 DIAGNOSIS — F02.80 ALZHEIMER'S DEMENTIA WITHOUT BEHAVIORAL DISTURBANCE (HCC): Primary | ICD-10-CM

## 2023-02-28 DIAGNOSIS — G30.9 ALZHEIMER'S DEMENTIA WITHOUT BEHAVIORAL DISTURBANCE (HCC): Primary | ICD-10-CM

## 2023-02-28 DIAGNOSIS — I10 ESSENTIAL HYPERTENSION: ICD-10-CM

## 2023-02-28 PROCEDURE — 99213 OFFICE O/P EST LOW 20 MIN: CPT

## 2023-02-28 NOTE — PROGRESS NOTES
Outpatient Clinic Established Patient Note    Patient: Ashanti Palomino  : 1935 (89 y.o.)  Date: 2023    CC: Follow-up 6    HPI:    Patient is an 81years old female with past medical history of dementia, hypertension who presented for a follow-up visit. Patient has advanced dementia and is taken care by her family who are very supportive. Patient has had recent falls but no reported falls since the last visit. Patient's son with her for the appointment. Patient son states that she has been stable since her last visit with no falls, does not complain of any pain in her chest.  Denies any headache, nausea, vomiting, abdominal pain, shortness of breath. No acute complaints at this time. Patient has been off of her antihypertensives since the last visit, doing well. Home Meds:  Prior to Visit Medications    Medication Sig Taking?  Authorizing Provider   alendronate (FOSAMAX) 70 MG tablet TAKE ONE TABLET BY MOUTH PER WEEK Yes Tilda Cockayne, MD   donepezil (ARICEPT) 10 MG tablet Take 1 tablet by mouth nightly Yes Tilda Cockayne, MD   galantamine (RAZADYNE) 4 MG tablet Take 1 tablet by mouth 2 times daily Yes Margreta Mcburney, MD   calcium-cholecalciferol (OYSCO 500 + D) 500-200 MG-UNIT per tablet TAKE 1 TABLET BY MOUTH TWICE A DAY Yes Rowan White MD   budesonide (PULMICORT FLEXHALER) 180 MCG/ACT AEPB inhaler Inhale 2 puffs into the lungs 2 times daily Yes Cecilia Wilson MD   ACETAMINOPHEN PO Take by mouth Yes Historical Provider, MD   albuterol sulfate HFA (VENTOLIN HFA) 108 (90 Base) MCG/ACT inhaler Inhale 2 puffs into the lungs every 6 hours as needed for Wheezing  Patient not taking: No sig reported  Areli Arenas MD       Allergies:    Memantine, Aspirin, Codeine, Pcn [penicillins], Prednisone, and Zyban [bupropion hcl]    Health Maintenance Due   Topic Date Due    Depression Screen  Never done    Shingles vaccine (1 of 2) Never done    Annual Wellness Visit (AWV)  Never done COVID-19 Vaccine (4 - Booster for Moderna series) 03/07/2022       Immunization History   Administered Date(s) Administered    COVID-19, MODERNA BLUE border, Primary or Immunocompromised, (age 12y+), IM, 100 mcg/0.5mL 01/24/2021, 02/21/2021    Influenza, FLUAD, (age 72 y+), Adjuvanted, 0.5mL 01/13/2023    Influenza, FLUARIX, FLULAVAL, FLUZONE (age 10 mo+) AND AFLURIA, (age 1 y+), PF, 0.5mL 12/04/2019    Pneumococcal Conjugate 13-valent ([id number]) 08/26/2019    Pneumococcal Polysaccharide ([id number]) 01/26/2021    Tdap (Boostrix, Adacel) 08/26/2019       Review of Systems  A 10-organ Review Of Systems was obtained and otherwise unremarkable except as per HPI. Data: Old records have been reviewed electronically. PHYSICAL EXAM:  /68 (Site: Left Upper Arm, Position: Sitting, Cuff Size: Small Adult)   Pulse 69   Temp 97.2 °F (36.2 °C) (Temporal)   Resp 18   Ht 5' 5\" (1.651 m)   Wt 100 lb 14.4 oz (45.8 kg)   SpO2 98%   BMI 16.79 kg/m²   Physical Exam  Constitutional:       Appearance: Normal appearance. HENT:      Head: Normocephalic and atraumatic. Right Ear: External ear normal.      Left Ear: External ear normal.      Nose: Nose normal.      Mouth/Throat:      Mouth: Mucous membranes are moist.   Eyes:      Pupils: Pupils are equal, round, and reactive to light. Cardiovascular:      Rate and Rhythm: Normal rate and regular rhythm. Pulses: Normal pulses. Heart sounds: Normal heart sounds. Pulmonary:      Effort: Pulmonary effort is normal.      Breath sounds: Normal breath sounds. Abdominal:      General: There is no distension. Palpations: Abdomen is soft. Tenderness: There is no abdominal tenderness. Musculoskeletal:         General: Normal range of motion. Skin:     General: Skin is warm. Neurological:      General: No focal deficit present. Mental Status: She is alert. Mental status is at baseline.    Psychiatric:         Mood and Affect: Mood normal. Assessment & Plan:      1. Alzheimer's dementia without behavioral disturbance ([facility] [address]  -Continue galantamine and Aricept    2. Essential hypertension  Blood pressure stable this visit  -Patient continues to be off of amlodipine which was stopped last visit  -We will monitor for now    3. Osteoporosis without current pathological fracture, unspecified osteoporosis type  -Continue Fosamax 70 mg 1 tablet by mouth every week      Return in about 3 months (around 5/28/2023).     Dispo: Pt has been staffed with Dr. Aileen Dai MD  _______________  Evelia Viera MD, 2/28/2023 11:34 AM   PGY-2

## 2023-05-23 ENCOUNTER — TELEPHONE (OUTPATIENT)
Dept: INTERNAL MEDICINE CLINIC | Age: 88
End: 2023-05-23

## 2023-05-23 NOTE — TELEPHONE ENCOUNTER
Pt's son called, notes that her dementia has gotten notably worse in last 12h, unable to remember family members name or her name. Pt son concerned. I scheduled an appt for her Friday afternoon, but is there any advice for before then?

## 2023-06-01 ENCOUNTER — OFFICE VISIT (OUTPATIENT)
Dept: INTERNAL MEDICINE CLINIC | Age: 88
End: 2023-06-01
Payer: MEDICARE

## 2023-06-01 VITALS
HEART RATE: 72 BPM | WEIGHT: 96.8 LBS | HEIGHT: 64 IN | DIASTOLIC BLOOD PRESSURE: 72 MMHG | SYSTOLIC BLOOD PRESSURE: 132 MMHG | RESPIRATION RATE: 16 BRPM | TEMPERATURE: 97.3 F | BODY MASS INDEX: 16.53 KG/M2 | OXYGEN SATURATION: 98 %

## 2023-06-01 DIAGNOSIS — F02.80 ALZHEIMER'S DEMENTIA WITHOUT BEHAVIORAL DISTURBANCE (HCC): Primary | ICD-10-CM

## 2023-06-01 DIAGNOSIS — I10 ESSENTIAL HYPERTENSION: ICD-10-CM

## 2023-06-01 DIAGNOSIS — M81.0 OSTEOPOROSIS WITHOUT CURRENT PATHOLOGICAL FRACTURE, UNSPECIFIED OSTEOPOROSIS TYPE: ICD-10-CM

## 2023-06-01 DIAGNOSIS — G30.9 ALZHEIMER'S DEMENTIA WITHOUT BEHAVIORAL DISTURBANCE (HCC): Primary | ICD-10-CM

## 2023-06-01 PROCEDURE — 99213 OFFICE O/P EST LOW 20 MIN: CPT | Performed by: PHYSICIAN ASSISTANT

## 2023-06-01 RX ORDER — NITROFURANTOIN 25; 75 MG/1; MG/1
100 CAPSULE ORAL 2 TIMES DAILY
Qty: 14 CAPSULE | Refills: 0 | Status: SHIPPED | OUTPATIENT
Start: 2023-06-01 | End: 2023-06-08

## 2023-06-01 NOTE — PATIENT INSTRUCTIONS
RTC in 3 months    Come back in 3-4 weeks if she does not return back to baseline    I called in Kelli Paulino 103 which is an antibiotic to treat suspected UTI    Continue to encourage good food and oral intake

## 2023-06-01 NOTE — PROGRESS NOTES
Mental status is at baseline. Psychiatric:         Mood and Affect: Mood normal.      Comments: She is alert and oriented to person, place. She is not alert and oriented to time. She could recall that this is summer but she could not identify her son sitting next to her and stated she was her nephew.        Assessment & Plan:      Alzheimer's dementia without behavioral disturbance ([facility] [address]  - Continue Aricept  - Unable to get UA at this time  - Will prophylactically treat for suspected UTI with Macrobid for 7-day course  - I encourage son to continue to monitor her p.o. intake and encourage hydration  - I recommended patient come back in 3 to 4 weeks if she does not improve    Essential hypertension  Blood pressure stable this visit  - Continue to hold off on BP agent  - educated to continue to monitor salt intake    Osteoporosis without current pathological fracture, unspecified osteoporosis type  - Continue Fosamax 70 mg 1 tablet by mouth every week    Dispo: Pt has been staffed with Dr. Levi Banda MD  _______________  Em Bowie MD, 6/1/2023 9:52 AM   PGY-3

## 2023-09-14 DIAGNOSIS — F02.80 ALZHEIMER'S DEMENTIA WITHOUT BEHAVIORAL DISTURBANCE (HCC): ICD-10-CM

## 2023-09-14 DIAGNOSIS — G30.9 ALZHEIMER'S DEMENTIA WITHOUT BEHAVIORAL DISTURBANCE (HCC): ICD-10-CM

## 2023-09-14 RX ORDER — DONEPEZIL HYDROCHLORIDE 10 MG/1
10 TABLET, FILM COATED ORAL NIGHTLY
Qty: 90 TABLET | Refills: 1 | Status: SHIPPED | OUTPATIENT
Start: 2023-09-14 | End: 2023-09-29 | Stop reason: SDUPTHER

## 2023-09-14 NOTE — TELEPHONE ENCOUNTER
Requested Prescriptions     Pending Prescriptions Disp Refills    donepezil (ARICEPT) 10 MG tablet 90 tablet 1     Sig: Take 1 tablet by mouth nightly       Last Clinic Visit:  6/12/2023     Next Clinic Appointment:  Visit date not found

## 2023-09-28 DIAGNOSIS — G30.9 ALZHEIMER'S DEMENTIA WITHOUT BEHAVIORAL DISTURBANCE (HCC): ICD-10-CM

## 2023-09-28 DIAGNOSIS — F02.80 ALZHEIMER'S DEMENTIA WITHOUT BEHAVIORAL DISTURBANCE (HCC): ICD-10-CM

## 2023-09-28 NOTE — TELEPHONE ENCOUNTER
Requested Prescriptions     Pending Prescriptions Disp Refills    donepezil (ARICEPT) 10 MG tablet 90 tablet 1     Sig: Take 1 tablet by mouth nightly       Last Clinic Visit:  6/12/2023     Next Clinic Appointment:  Visit date not   Found    WAS SENT TO [address] [place] [address] pharmacy

## 2023-09-28 NOTE — TELEPHONE ENCOUNTER
PT'S SON JOHNNY STATED RX FOR DONEPEZIL SHOULD HAVE GONE TO [facility] LISTED ON PROFILE AND NOT [facility]. PLEASE RESEND TO CORRECT PHARMMoisés JOHNNY CAN BE REACHED [phone] TO LET HIM KNOW WHEN RX HAS BEEN SENT.

## 2023-09-29 RX ORDER — DONEPEZIL HYDROCHLORIDE 10 MG/1
10 TABLET, FILM COATED ORAL NIGHTLY
Qty: 90 TABLET | Refills: 1 | Status: SHIPPED | OUTPATIENT
Start: 2023-09-29

## 2023-11-20 DIAGNOSIS — G30.9 ALZHEIMER'S DEMENTIA WITHOUT BEHAVIORAL DISTURBANCE (HCC): ICD-10-CM

## 2023-11-20 DIAGNOSIS — F02.80 ALZHEIMER'S DEMENTIA WITHOUT BEHAVIORAL DISTURBANCE (HCC): ICD-10-CM

## 2023-11-20 RX ORDER — DONEPEZIL HYDROCHLORIDE 10 MG/1
10 TABLET, FILM COATED ORAL NIGHTLY
Qty: 90 TABLET | Refills: 0 | Status: SHIPPED | OUTPATIENT
Start: 2023-11-20 | End: 2023-11-20 | Stop reason: SDUPTHER

## 2023-11-20 RX ORDER — DONEPEZIL HYDROCHLORIDE 10 MG/1
10 TABLET, FILM COATED ORAL NIGHTLY
Qty: 90 TABLET | Refills: 0 | Status: SHIPPED | OUTPATIENT
Start: 2023-11-20

## 2023-11-20 RX ORDER — DONEPEZIL HYDROCHLORIDE 10 MG/1
10 TABLET, FILM COATED ORAL NIGHTLY
Qty: 90 TABLET | Refills: 0 | Status: SHIPPED | OUTPATIENT
Start: 2023-11-20 | End: 2023-11-20

## 2023-12-06 ENCOUNTER — TELEPHONE (OUTPATIENT)
Dept: INTERNAL MEDICINE CLINIC | Age: 88
End: 2023-12-06

## 2023-12-06 NOTE — TELEPHONE ENCOUNTER
PT'S SON JOHNNY STATED PT HAS A COUGH,RUNNING NOSE WITH YELLOW MUCUS COMING OUT, CHEST CONGESTION. PT HAD COVID HOME TEST TODAY IT WAS NEG. JOHNNY CAN BE REACHED [phone]. SHOULD PT COME IN OR NOT?

## 2023-12-08 ENCOUNTER — OFFICE VISIT (OUTPATIENT)
Dept: INTERNAL MEDICINE CLINIC | Age: 88
End: 2023-12-08
Payer: MEDICARE

## 2023-12-08 VITALS
OXYGEN SATURATION: 92 % | BODY MASS INDEX: 16.05 KG/M2 | HEIGHT: 64 IN | TEMPERATURE: 97.7 F | HEART RATE: 97 BPM | RESPIRATION RATE: 36 BRPM | SYSTOLIC BLOOD PRESSURE: 123 MMHG | DIASTOLIC BLOOD PRESSURE: 76 MMHG | WEIGHT: 94 LBS

## 2023-12-08 DIAGNOSIS — E89.0 H/O THYROIDECTOMY: ICD-10-CM

## 2023-12-08 DIAGNOSIS — F02.80 ALZHEIMER'S DEMENTIA WITHOUT BEHAVIORAL DISTURBANCE (HCC): Primary | ICD-10-CM

## 2023-12-08 DIAGNOSIS — G30.9 ALZHEIMER'S DEMENTIA WITHOUT BEHAVIORAL DISTURBANCE (HCC): Primary | ICD-10-CM

## 2023-12-08 DIAGNOSIS — M81.0 OSTEOPOROSIS WITHOUT CURRENT PATHOLOGICAL FRACTURE, UNSPECIFIED OSTEOPOROSIS TYPE: ICD-10-CM

## 2023-12-08 DIAGNOSIS — I10 ESSENTIAL HYPERTENSION: ICD-10-CM

## 2023-12-08 DIAGNOSIS — R53.83 OTHER FATIGUE: ICD-10-CM

## 2023-12-08 DIAGNOSIS — E55.9 VITAMIN D DEFICIENCY: ICD-10-CM

## 2023-12-08 PROCEDURE — 99213 OFFICE O/P EST LOW 20 MIN: CPT

## 2023-12-08 RX ORDER — CEFDINIR 300 MG/1
300 CAPSULE ORAL 2 TIMES DAILY
Qty: 14 CAPSULE | Refills: 0 | Status: SHIPPED | OUTPATIENT
Start: 2023-12-08 | End: 2023-12-15

## 2023-12-08 ASSESSMENT — PATIENT HEALTH QUESTIONNAIRE - PHQ9: DEPRESSION UNABLE TO ASSESS: FUNCTIONAL CAPACITY MOTIVATION LIMITS ACCURACY

## 2023-12-08 NOTE — PATIENT INSTRUCTIONS
As discussed during your visit, please:    Get labs drawn at any [facility] lab location. If you do not receive the results within one week of being drawn, please call the clinic at [phone]. Please return in 5 weeks for follow-up. Please call the clinic if you have any questions or concerns, [phone].

## 2023-12-08 NOTE — PROGRESS NOTES
[facility]    Mrs. Juan Antonio Robertson is an 81 y.o. female with a medical history significant for hypertension, Alzheimer's dementia, and osteoporosis, who presents to the clinic with her son for a 6-month routine follow-up visit. Her son expresses concern that her mentation and overall ability to perform ADLs has substantially declined over the past 2 months while he himself was hospitalized in the ICU and his wife was overseeing the patient's care. He states she is now experiencing bowel and bladder incontinence requiring Depends, having increased frequency in episodes of long-term and short-term recall, difficulty recognizing faces of people that she sees every day like her daughter-in-law, gets lost in the home and is confused about where she is located even though she has lived in the same home with her son and daughter-in-law for the past 4.5 years. She's had visual hallucinations, shuffling/waddling gait grabbing onto things to support herself (has not had any falls), hands will tremor after eating her third meal of the day, and will sit down and fall asleep quickly. She will eat her food rapidly, consuming 3 meals per day, but eats her food \"like a 3year-old\", having trouble with coordination. Her family gives her her daily medication, including donepezil 10 mg qd since 7/2019. Her son states they were unable to get good insurance coverage for the galantamine. Her son also reports that over the last week or so, she has had thick yellow-colored nasal mucus with a non-productive cough; he too has been sick with a dry cough. She took a home COVID-19 test, which was negative. He is uncertain as to whether or not she has been experiencing diarrhea as his wife is the one who cleans her up.   She denies sweats, chills, sore throat, chest pain/pressure/tightness, difficulty breathing, shortness of breath, abdominal pain, nausea, vomiting,

## 2024-03-15 DIAGNOSIS — F02.80 ALZHEIMER'S DEMENTIA WITHOUT BEHAVIORAL DISTURBANCE (HCC): ICD-10-CM

## 2024-03-15 DIAGNOSIS — G30.9 ALZHEIMER'S DEMENTIA WITHOUT BEHAVIORAL DISTURBANCE (HCC): ICD-10-CM

## 2024-03-18 RX ORDER — DONEPEZIL HYDROCHLORIDE 10 MG/1
10 TABLET, FILM COATED ORAL NIGHTLY
Qty: 90 TABLET | Refills: 3 | Status: SHIPPED | OUTPATIENT
Start: 2024-03-18

## 2024-03-18 NOTE — TELEPHONE ENCOUNTER
Requested Prescriptions     Pending Prescriptions Disp Refills    donepezil (ARICEPT) 10 MG tablet [Pharmacy Med Name: DONEPEZIL HYDROCHLORIDE 10 MG Tablet] 90 tablet 3     Sig: TAKE 1 TABLET EVERY NIGHT       Last Clinic Visit:  12/8/2023     Next Clinic Appointment:  Visit date not found

## 2024-06-13 DIAGNOSIS — M81.0 AGE-RELATED OSTEOPOROSIS WITHOUT CURRENT PATHOLOGICAL FRACTURE: ICD-10-CM

## 2024-06-13 RX ORDER — ALENDRONATE SODIUM 70 MG/1
TABLET ORAL
Qty: 12 TABLET | Refills: 5 | Status: SHIPPED | OUTPATIENT
Start: 2024-06-13

## 2024-06-13 NOTE — TELEPHONE ENCOUNTER
Requested Prescriptions     Pending Prescriptions Disp Refills    alendronate (FOSAMAX) 70 MG tablet 12 tablet 5     Sig: TAKE ONE TABLET BY MOUTH PER WEEK       Last Clinic Visit:  12/8/2023     Next Clinic Appointment:  Visit date not found

## 2025-04-03 ENCOUNTER — OFFICE VISIT (OUTPATIENT)
Dept: INTERNAL MEDICINE CLINIC | Age: 89
End: 2025-04-03
Payer: MEDICARE

## 2025-04-03 VITALS
SYSTOLIC BLOOD PRESSURE: 156 MMHG | DIASTOLIC BLOOD PRESSURE: 84 MMHG | WEIGHT: 90 LBS | RESPIRATION RATE: 18 BRPM | OXYGEN SATURATION: 95 % | TEMPERATURE: 97.2 F | BODY MASS INDEX: 15.45 KG/M2 | HEART RATE: 64 BPM

## 2025-04-03 DIAGNOSIS — Z91.81 AT HIGH RISK FOR FALLS: ICD-10-CM

## 2025-04-03 DIAGNOSIS — G30.9 ALZHEIMER'S DEMENTIA WITHOUT BEHAVIORAL DISTURBANCE (HCC): Primary | ICD-10-CM

## 2025-04-03 DIAGNOSIS — F02.80 ALZHEIMER'S DEMENTIA WITHOUT BEHAVIORAL DISTURBANCE (HCC): Primary | ICD-10-CM

## 2025-04-03 DIAGNOSIS — M81.0 AGE-RELATED OSTEOPOROSIS WITHOUT CURRENT PATHOLOGICAL FRACTURE: ICD-10-CM

## 2025-04-03 PROCEDURE — 99213 OFFICE O/P EST LOW 20 MIN: CPT

## 2025-04-03 RX ORDER — DONEPEZIL HYDROCHLORIDE 10 MG/1
10 TABLET, FILM COATED ORAL NIGHTLY
Qty: 90 TABLET | Refills: 3 | Status: SHIPPED | OUTPATIENT
Start: 2025-04-03

## 2025-04-03 RX ORDER — ALENDRONATE SODIUM 70 MG/1
TABLET ORAL
Qty: 12 TABLET | Refills: 5 | Status: SHIPPED | OUTPATIENT
Start: 2025-04-03

## 2025-04-03 SDOH — ECONOMIC STABILITY: FOOD INSECURITY: WITHIN THE PAST 12 MONTHS, THE FOOD YOU BOUGHT JUST DIDN'T LAST AND YOU DIDN'T HAVE MONEY TO GET MORE.: NEVER TRUE

## 2025-04-03 SDOH — ECONOMIC STABILITY: FOOD INSECURITY: WITHIN THE PAST 12 MONTHS, YOU WORRIED THAT YOUR FOOD WOULD RUN OUT BEFORE YOU GOT MONEY TO BUY MORE.: NEVER TRUE

## 2025-04-03 ASSESSMENT — PATIENT HEALTH QUESTIONNAIRE - PHQ9: DEPRESSION UNABLE TO ASSESS: FUNCTIONAL CAPACITY MOTIVATION LIMITS ACCURACY

## 2025-04-03 NOTE — ASSESSMENT & PLAN NOTE
Patient is nonverbal, noncommunicative, frequent visual hallucinations, unable to perform her own ADLs, but able to eat on her own. She will require 24 hour assistance and help with her ADLs.   - referral to    - discussed hospice care and respite care

## 2025-04-03 NOTE — PROGRESS NOTES
[facility]    Madelyn Cadena is a 89 y.o. female, here for evaluation of the following concerns: healthcare services     HPI    Madelyn Cadena is a 89 year old female with PMHx of alzheimer dementia, hypertension, and osteoporosis who presents for a routine follow up visit. Patient was accompanied by her son. Patient has been needing more assistance at home. She is unable to perform her daily activities. She has severe dementia and is nonverbal. Son states she needs help with dressing herself in the morning. She has frequent falls.     Review of Systems   Reason unable to perform ROS: nonverbal.       MEDICATIONS:  Prior to Visit Medications    Medication Sig Taking? Authorizing Provider   donepezil (ARICEPT) 10 MG tablet Take 1 tablet by mouth nightly Yes Herminia Sigala MD   alendronate (FOSAMAX) 70 MG tablet TAKE ONE TABLET BY MOUTH PER WEEK Yes Herminia Sigala MD   calcium-cholecalciferol (OYSCO 500 + D) 500-200 MG-UNIT per tablet TAKE 1 TABLET BY MOUTH TWICE A DAY  Tran Gomes MD   ACETAMINOPHEN PO Take by mouth  Provider, MD Evette   albuterol sulfate HFA (VENTOLIN HFA) 108 (90 Base) MCG/ACT inhaler Inhale 2 puffs into the lungs every 6 hours as needed for Wheezing  Marysol Miranda MD        There were no vitals filed for this visit.   Estimated body mass index is 16.14 kg/m² as calculated from the following:    Height as of 12/8/23: 1.626 m (5' 4\").    Weight as of 12/8/23: 42.6 kg (94 lb).  Physical Exam  Constitutional:       Appearance: She is underweight.      Comments: Frail elderly lady    Cardiovascular:      Rate and Rhythm: Normal rate and regular rhythm.      Pulses: Normal pulses.      Heart sounds: Normal heart sounds.   Pulmonary:      Effort: Pulmonary effort is normal.      Breath sounds: Normal breath sounds.   Abdominal:      General: Abdomen is flat. Bowel sounds are normal. There is no distension.      Palpations: Abdomen is soft.

## 2025-04-14 ENCOUNTER — COMMUNITY OUTREACH (OUTPATIENT)
Dept: OTHER | Age: 89
End: 2025-04-14

## 2025-04-14 NOTE — PROGRESS NOTES
ANNY spoke with patient's son, Agustín on this date who is the POA. Agustín asked SW about getting patient into respite stay at a facility for upcoming trip planned in July. SW advised to reach out to [facility] to see if patient is eligible for care in the home on an ongoing basis including respite stays in a facility and respite care at home when needed. HILLARY also advised that [facility] has a resource directory on their website so he can look up facilities that do respite stays so that he could reach out to see if they have space and take patient's current insurance. Son voiced understanding to information provided and will reach out with any other questions.

## 2025-05-21 ENCOUNTER — TELEPHONE (OUTPATIENT)
Dept: INTERNAL MEDICINE CLINIC | Age: 89
End: 2025-05-21

## 2025-05-21 NOTE — PROGRESS NOTES
Called the patient back. He needs some paper work to be done from the insurance company. I just asked him to tell insurance company to call us directly and we will be able to help.

## 2025-05-21 NOTE — TELEPHONE ENCOUNTER
Link Hughes asking for a referral for respite care for Patient.    Son can be reached at [phone]   Yes...

## 2025-05-23 ENCOUNTER — TELEPHONE (OUTPATIENT)
Dept: INTERNAL MEDICINE CLINIC | Age: 89
End: 2025-05-23

## 2025-05-23 NOTE — TELEPHONE ENCOUNTER
Son called needing to speak with a Provider concerning Respite Care for patient.    Agustín can be reached at [phone]

## 2025-06-09 ENCOUNTER — TELEPHONE (OUTPATIENT)
Dept: INTERNAL MEDICINE CLINIC | Age: 89
End: 2025-06-09

## 2025-06-20 ENCOUNTER — TELEPHONE (OUTPATIENT)
Dept: INTERNAL MEDICINE CLINIC | Age: 89
End: 2025-06-20

## 2025-06-20 NOTE — TELEPHONE ENCOUNTER
Spoke to this patient's son and Jerrell regarding the patient staying at a skilled nursing facility. Jerrell stated they do not need any information from the doctors office for the resident to use respite stay. I notified the patient's son and gave him the number to follow up with Jerrell.

## 2025-06-24 ENCOUNTER — TELEPHONE (OUTPATIENT)
Dept: INTERNAL MEDICINE CLINIC | Age: 89
End: 2025-06-24

## 2025-06-30 ENCOUNTER — OFFICE VISIT (OUTPATIENT)
Dept: INTERNAL MEDICINE CLINIC | Age: 89
End: 2025-06-30
Payer: MEDICARE

## 2025-06-30 DIAGNOSIS — M79.676 CHRONIC PAIN OF TOE, UNSPECIFIED LATERALITY: ICD-10-CM

## 2025-06-30 DIAGNOSIS — G89.29 CHRONIC PAIN OF TOE, UNSPECIFIED LATERALITY: ICD-10-CM

## 2025-06-30 DIAGNOSIS — B35.1 ONYCHOMYCOSIS: Primary | ICD-10-CM

## 2025-06-30 PROCEDURE — 11721 DEBRIDE NAIL 6 OR MORE: CPT

## 2025-06-30 PROCEDURE — 99213 OFFICE O/P EST LOW 20 MIN: CPT

## 2025-06-30 NOTE — PROGRESS NOTES
Department of Podiatry  Resident Progress Note    Madelyn Cadena  Allergies: Memantine, Aspirin, Codeine, Pcn [penicillins], Prednisone, and Zyban [bupropion hcl]    SUBJECTIVE  The patient is a 89 y.o. female who presents for a routine foot/ankle check as well as nail debridement. Patient is alert and orientated, however was not amenable to answering questions with answers other than yes or no. Patient has not seen her PCP recently and does not know what medications she is taking. Has no other pedal complaints. Denies N/V/C/SoB.     Past Medical History:        Diagnosis Date    Asthma     Constipation     Hypertension     Osteoporosis     Sinus trouble     hx of    Vitamin D deficiency        REVIEW OF SYSTEMS:  Review of Systems: Pertinent positive and negative findings as documented in the HPI, otherwise all other systems were reviewed and were negative.     OBJECTIVE  Patient presents unassisted ambulating in normal shoe wear.     VASCULAR: DP and PT pulses are palpable 2/4 b/l. CFT is brisk to the digits of the foot b/l. Skin temperature is warm to cool from proximal to distal with no focal calor noted. No edema noted. No pain with calf compression b/l.    NEUROLOGIC: Gross and epicritic sensation is intact b/l. Protective sensation is appreciated at all pedal sites b/l.    DERMATOLOGIC: Nails 1-5 b/l are elongated, thickened, discolored. Webspaces 1-4 b/l are clean, dry, and intact. No hyperkeratosis noted. No open wounds noted. No subcutaneous nodules, rashes, or other skin lesions noted.    MUSCULOSKELETAL: Muscle strength is 5/5 for all pedal groups tested. Pain with palpation around the nails of all digits b/l. Ankle joint ROM is decreased in dorsiflexion with the knee extended. No obvious biomechanical abnormalities.       IMAGING  N/a    ASSESSMENT  -Onychomycosis, digits 1-5 b/l  -Toe pain    PLAN  -Evaluation and management x 15 minutes and greater than 50% of the time spent explaining the etiology

## 2025-07-01 ENCOUNTER — OFFICE VISIT (OUTPATIENT)
Dept: INTERNAL MEDICINE CLINIC | Age: 89
End: 2025-07-01
Payer: MEDICARE

## 2025-07-01 VITALS
HEART RATE: 72 BPM | BODY MASS INDEX: 14.74 KG/M2 | OXYGEN SATURATION: 95 % | TEMPERATURE: 97.3 F | RESPIRATION RATE: 16 BRPM | SYSTOLIC BLOOD PRESSURE: 147 MMHG | DIASTOLIC BLOOD PRESSURE: 88 MMHG | HEIGHT: 65 IN | WEIGHT: 88.5 LBS

## 2025-07-01 DIAGNOSIS — R63.4 WEIGHT LOSS, UNINTENTIONAL: Chronic | ICD-10-CM

## 2025-07-01 DIAGNOSIS — G30.9 ALZHEIMER'S DEMENTIA WITHOUT BEHAVIORAL DISTURBANCE (HCC): Primary | ICD-10-CM

## 2025-07-01 DIAGNOSIS — F02.80 ALZHEIMER'S DEMENTIA WITHOUT BEHAVIORAL DISTURBANCE (HCC): Primary | ICD-10-CM

## 2025-07-01 PROCEDURE — 99213 OFFICE O/P EST LOW 20 MIN: CPT

## 2025-07-01 ASSESSMENT — PATIENT HEALTH QUESTIONNAIRE - PHQ9
SUM OF ALL RESPONSES TO PHQ QUESTIONS 1-9: 1
SUM OF ALL RESPONSES TO PHQ QUESTIONS 1-9: 1
2. FEELING DOWN, DEPRESSED OR HOPELESS: SEVERAL DAYS
1. LITTLE INTEREST OR PLEASURE IN DOING THINGS: NOT AT ALL
SUM OF ALL RESPONSES TO PHQ QUESTIONS 1-9: 1
SUM OF ALL RESPONSES TO PHQ QUESTIONS 1-9: 1

## 2025-07-01 ASSESSMENT — ENCOUNTER SYMPTOMS
EYE REDNESS: 0
SHORTNESS OF BREATH: 0
COUGH: 0

## 2025-07-01 NOTE — ASSESSMENT & PLAN NOTE
Weight is down 1.5 lbs since last visit on 04/03/2025, and down 12 lbs over past 2 years. She has a good appetite and eats well but is still losing weight.    Will trial nutritional supplementation such as Ensure or Magic Cup to help increase protein/calorie intake.

## 2025-07-01 NOTE — PROGRESS NOTES
[facility]    Madelyn Cadena is a 89 y.o. female, with a MHx significant for Alzheimer dementia, hypertension, osteoporosis, who presents to the clinic for routine check-up.    Patient is here today with her son for check-up as she will be spending 10 days in respite care. She is noncommunicative at baseline, history is provided by her son. He reports that she has been largely stable from her last visit, though she is requiring increasing assistance with ambulation and transfers. She has had two recent falls, one when getting out of bed the other when getting out of a chair. Her behavior has been stable and she can be easily redirected if she gets upset. He reports that she has been eating well but has still lost some weight. He is interested in trying nutritional supplements to help maintain her weight.        Review of Systems   Eyes:  Negative for redness.   Respiratory:  Negative for cough and shortness of breath.    Cardiovascular:  Negative for leg swelling.   Psychiatric/Behavioral:  Positive for confusion. Negative for agitation. The patient is not hyperactive.    All other systems reviewed and are negative.      MEDICATION:  Prior to Visit Medications    Medication Sig Taking? Authorizing Provider   donepezil (ARICEPT) 10 MG tablet Take 1 tablet by mouth nightly Yes Herminia Sigala MD   alendronate (FOSAMAX) 70 MG tablet TAKE ONE TABLET BY MOUTH PER WEEK Yes Herminia Sigala MD   calcium-cholecalciferol (OYSCO 500 + D) 500-200 MG-UNIT per tablet TAKE 1 TABLET BY MOUTH TWICE A DAY Yes Tran Gomes MD   ACETAMINOPHEN PO Take by mouth Yes Provider, MD Evette   albuterol sulfate HFA (VENTOLIN HFA) 108 (90 Base) MCG/ACT inhaler Inhale 2 puffs into the lungs every 6 hours as needed for Wheezing  Patient not taking: Reported on 7/1/2025  Marysol Miranda MD          VITALS:  Vitals:    07/01/25 1445 07/01/25 1458   BP: (!) 149/81 (!) 147/88   BP Site: Left

## 2025-07-01 NOTE — ASSESSMENT & PLAN NOTE
Patient is minimally verbal, may answer yes/no but otherwise noncommunicative. She has frequent visual hallucinations. She is able to eat on her own and has good appetite, but otherwise requires assistance with ADLs. She has had multiple falls and requires assistance with ambulation.

## 2025-07-01 NOTE — PATIENT INSTRUCTIONS
It was a pleasure seeing you in clinic today.    To help with nutrition, you can try a nutritional drink like Ensure or a dessert like Magic Cup.    Schedule a follow-up visit with us in about 3 months.